# Patient Record
Sex: FEMALE | Race: BLACK OR AFRICAN AMERICAN | NOT HISPANIC OR LATINO | Employment: FULL TIME | URBAN - METROPOLITAN AREA
[De-identification: names, ages, dates, MRNs, and addresses within clinical notes are randomized per-mention and may not be internally consistent; named-entity substitution may affect disease eponyms.]

---

## 2017-04-10 LAB
EXTERNAL ABO GROUPING: NORMAL
EXTERNAL ANTIBODY SCREEN: NEGATIVE
EXTERNAL CHLAMYDIA SCREEN: NEGATIVE
EXTERNAL GONORRHEA SCREEN: NEGATIVE
EXTERNAL HEPATITIS B SURFACE ANTIGEN: NEGATIVE
EXTERNAL RH FACTOR: POSITIVE
EXTERNAL RUBELLA QUALITATIVE: NORMAL
EXTERNAL SYPHILIS RPR SCREEN: NORMAL
EXTERNAL URINE DRUG SCREEN: NEGATIVE
HIV1 P24 AG SERPL QL IA: NORMAL

## 2017-04-14 ENCOUNTER — TELEPHONE (OUTPATIENT)
Dept: ENDOCRINOLOGY | Facility: CLINIC | Age: 32
End: 2017-04-14

## 2017-04-14 RX ORDER — LEVOTHYROXINE SODIUM 300 UG/1
300 TABLET ORAL DAILY
Qty: 30 TABLET | Refills: 5 | Status: SHIPPED | OUTPATIENT
Start: 2017-04-14 | End: 2018-06-13 | Stop reason: SDUPTHER

## 2017-04-14 NOTE — TELEPHONE ENCOUNTER
----- Message from Aisha Blackburn sent at 4/14/2017  8:52 AM EDT -----  Contact: DARON WITH [facility] DARON KEVIN WITH DR COMBS'S OFFICE CALLED TODAY WITH A TSH RESULT .803 FOR MS MICHAEL. SHE IS CURRENTLY NINE WEEKS PREGNANT. DR COMBS WOULD LIKE HER TO BE SEEN BY DR CABALLERO AS SOON AS POSSIBLE. MS MICHAEL IS CURRENTLY TAKING 200 MCG LEVOTHYROXINE. WILL SEND MESSAGE TO [facility] AS WELL.    [phone]

## 2017-04-14 NOTE — TELEPHONE ENCOUNTER
She has no appointment scheduled and no showed for her recent visit. Most likely she is not taking her medication. Please call the patient and ask her to take 300 mcg levothyroxine and send rx. If she is not taking any thyroid medication in the last few months (which is likely the truth), then we can go with her old dose of 250 mcg. Low level of thyroid hormone in pregnancy is dangerous, please make sure to let her know that this is important for the baby and bets outcomes. I can see her as soon as she can come, please ask her day preference and if she is flexible I will find a spot next week. But very important that she starts medication now.   Please call [facility] as well and let them know that she should be encouraged to take her levothyroxine

## 2017-04-14 NOTE — TELEPHONE ENCOUNTER
Spoke with pt and she stated that she has been taking her levothyroxine everyday. Rx for Levothyroxine 300 mcg was sent to pharmacy. Pt stated that she can come for an appointment next Tues or Thurs @ 8.

## 2017-04-25 ENCOUNTER — OFFICE VISIT (OUTPATIENT)
Dept: ENDOCRINOLOGY | Facility: CLINIC | Age: 32
End: 2017-04-25

## 2017-04-25 VITALS
HEIGHT: 63 IN | DIASTOLIC BLOOD PRESSURE: 72 MMHG | BODY MASS INDEX: 34.45 KG/M2 | SYSTOLIC BLOOD PRESSURE: 108 MMHG | WEIGHT: 194.4 LBS

## 2017-04-25 DIAGNOSIS — Z3A.11 11 WEEKS GESTATION OF PREGNANCY: ICD-10-CM

## 2017-04-25 DIAGNOSIS — E03.9 ACQUIRED HYPOTHYROIDISM: Primary | ICD-10-CM

## 2017-04-25 PROCEDURE — 99213 OFFICE O/P EST LOW 20 MIN: CPT | Performed by: INTERNAL MEDICINE

## 2017-04-25 NOTE — PROGRESS NOTES
Chief complaint  Hypothyroidism (F/u for hypothyroidism, [facility]. Pt stated no new sx or concerns)    Subjective   Yuki Conner is a 31 y.o. female is here today for follow-up.  Hypothyroidism: The patient is being seen for follow-up of hypothyroidism. The patient has hypothyroidism of undetermined etiology and dx 2009. She has difficult to control disease, -250 when first started coming in 2015. Likely noncompliance.   Last visit in March 2016 her TSH was 18.8 and levothyroxine dose increase to 250 mcg a day.   She is pregnant and her labs 4/14/17 at [facility] showed elevated TSH of 122.803. We have increased the levothyroxine to 300 mcg daily. She is pregnant now, 11 weeks .   She occasionally skips the medication.     No symptoms.       Medications    Current Outpatient Prescriptions:   •  levothyroxine (SYNTHROID, LEVOTHROID) 300 MCG tablet, Take 1 tablet by mouth Daily., Disp: 30 tablet, Rfl: 5  •  vitamin D (ERGOCALCIFEROL) 51600 UNITS capsule capsule, Take 1 capsule by mouth 1 (One) Time Per Week., Disp: 5 capsule, Rfl: 5    PMH  The following portions of the patient's history were reviewed and updated as appropriate: allergies, current medications, past family history, past medical history, past social history, past surgical history and problem list.    Review of systems  Review of Systems   Constitutional: Positive for fatigue. Negative for activity change, appetite change, chills and diaphoresis. Unexpected weight change: weight gain.   HENT: Negative for congestion, ear pain, facial swelling, hearing loss, postnasal drip, sore throat, trouble swallowing and voice change.    Eyes: Negative.  Negative for redness, itching and visual disturbance.   Respiratory: Negative for cough and shortness of breath.    Cardiovascular: Negative for chest pain, palpitations and leg swelling.   Gastrointestinal: Negative for abdominal distention, abdominal pain, constipation, diarrhea,  "nausea and vomiting.   Endocrine:        As listed in HPI   Genitourinary: Negative.    Musculoskeletal: Positive for arthralgias. Negative for back pain, joint swelling, neck pain and neck stiffness.   Skin: Negative.    Allergic/Immunologic: Negative.    Neurological: Negative for dizziness, tremors, syncope, weakness, light-headedness and headaches.   Hematological: Negative.    Psychiatric/Behavioral: Negative.    All other systems reviewed and are negative.      Physical exam  Objective   Blood pressure 108/72, height 63\" (160 cm), weight 194 lb 6.4 oz (88.2 kg).   Physical Exam   Constitutional: She is oriented to person, place, and time. She appears well-developed and well-nourished.   HENT:   Head: Normocephalic and atraumatic.   Eyes: Conjunctivae are normal.   Neck: Normal range of motion. No muscular tenderness present. Carotid bruit is not present. No thyroid mass and no thyromegaly present.   The neck is supple and no assimetry visualized   Cardiovascular: Normal rate, regular rhythm and normal heart sounds.    Pulmonary/Chest: Effort normal and breath sounds normal.   Lymphadenopathy:     She has no cervical adenopathy.   Neurological: She is alert and oriented to person, place, and time.   Psychiatric: She has a normal mood and affect. Thought content normal.   Vitals reviewed.        LABS AND IMAGING    Reviewed labs from [facility]/ . 08       Assessment  Assessment/Plan   Problem List Items Addressed This Visit        Endocrine    Hypothyroidism - Primary    Relevant Orders    T4, Free    TSH      Other Visit Diagnoses     11 weeks gestation of pregnancy              Plan  Levothyroxine 300 mcg a day. Repeat labs for dose adjustments in 4-6 weeks. Compliance encouraged. I have given her the sample of Tirosint 300 mcg daily for improved absorption   Explained the importance of taking thyroid medication during pregnancy. Hypothyroidism is associated with miscarriage and fetal development " complications. Compliance encouraged.   Take medication on an empty stomach.   Follow-up in 6-8 weeks, labs before the visit.

## 2017-08-24 LAB — EXTERNAL GLUCOSE TOLERANCE TEST FASTING: 92 MG/DL

## 2017-10-13 ENCOUNTER — HOSPITAL ENCOUNTER (OUTPATIENT)
Facility: HOSPITAL | Age: 32
End: 2017-10-13
Attending: OBSTETRICS & GYNECOLOGY | Admitting: OBSTETRICS & GYNECOLOGY

## 2017-10-19 ENCOUNTER — LAB REQUISITION (OUTPATIENT)
Dept: LAB | Facility: HOSPITAL | Age: 32
End: 2017-10-19

## 2017-10-19 DIAGNOSIS — Z34.83 ENCOUNTER FOR SUPERVISION OF OTHER NORMAL PREGNANCY, THIRD TRIMESTER: ICD-10-CM

## 2017-10-19 LAB — EXTERNAL GROUP B STREP ANTIGEN: NEGATIVE

## 2017-10-19 PROCEDURE — 87081 CULTURE SCREEN ONLY: CPT | Performed by: OBSTETRICS & GYNECOLOGY

## 2017-10-22 LAB — BACTERIA SPEC AEROBE CULT: NORMAL

## 2017-11-01 ENCOUNTER — PREP FOR SURGERY (OUTPATIENT)
Dept: OTHER | Facility: HOSPITAL | Age: 32
End: 2017-11-01

## 2017-11-01 DIAGNOSIS — Z3A.39 39 WEEKS GESTATION OF PREGNANCY: Primary | ICD-10-CM

## 2017-11-01 RX ORDER — OXYTOCIN/RINGER'S LACTATE 20/1000 ML
999 PLASTIC BAG, INJECTION (ML) INTRAVENOUS ONCE
Status: CANCELLED | OUTPATIENT
Start: 2017-11-01 | End: 2017-11-01

## 2017-11-01 RX ORDER — OXYTOCIN/RINGER'S LACTATE 20/1000 ML
125 PLASTIC BAG, INJECTION (ML) INTRAVENOUS CONTINUOUS PRN
Status: CANCELLED | OUTPATIENT
Start: 2017-11-01 | End: 2017-11-02

## 2017-11-01 RX ORDER — SODIUM CHLORIDE, SODIUM LACTATE, POTASSIUM CHLORIDE, CALCIUM CHLORIDE 600; 310; 30; 20 MG/100ML; MG/100ML; MG/100ML; MG/100ML
125 INJECTION, SOLUTION INTRAVENOUS CONTINUOUS
Status: CANCELLED | OUTPATIENT
Start: 2017-11-01

## 2017-11-01 RX ORDER — MAGNESIUM CARB/ALUMINUM HYDROX 105-160MG
30 TABLET,CHEWABLE ORAL ONCE
Status: CANCELLED | OUTPATIENT
Start: 2017-11-01 | End: 2017-11-01

## 2017-11-01 RX ORDER — OXYTOCIN/RINGER'S LACTATE 30/500 ML
2-24 PLASTIC BAG, INJECTION (ML) INTRAVENOUS
Status: CANCELLED | OUTPATIENT
Start: 2017-11-01

## 2017-11-01 RX ORDER — SODIUM CHLORIDE 0.9 % (FLUSH) 0.9 %
1-10 SYRINGE (ML) INJECTION AS NEEDED
Status: CANCELLED | OUTPATIENT
Start: 2017-11-01

## 2017-11-08 ENCOUNTER — HOSPITAL ENCOUNTER (INPATIENT)
Dept: LABOR AND DELIVERY | Facility: HOSPITAL | Age: 32
LOS: 3 days | Discharge: HOME | End: 2017-11-11
Attending: OBSTETRICS & GYNECOLOGY | Admitting: OBSTETRICS & GYNECOLOGY

## 2017-11-08 DIAGNOSIS — Z3A.39 39 WEEKS GESTATION OF PREGNANCY: ICD-10-CM

## 2017-11-08 PROBLEM — Z34.90 PREGNANCY: Status: ACTIVE | Noted: 2017-11-08

## 2017-11-08 LAB
DEPRECATED RDW RBC AUTO: 44.6 FL (ref 37–54)
ERYTHROCYTE [DISTWIDTH] IN BLOOD BY AUTOMATED COUNT: 14.1 % (ref 11.3–14.5)
HCT VFR BLD AUTO: 33.6 % (ref 34.5–44)
HGB BLD-MCNC: 11.6 G/DL (ref 11.5–15.5)
MCH RBC QN AUTO: 30.1 PG (ref 27–31)
MCHC RBC AUTO-ENTMCNC: 34.5 G/DL (ref 32–36)
MCV RBC AUTO: 87.3 FL (ref 80–99)
PLATELET # BLD AUTO: 109 10*3/MM3 (ref 150–450)
PMV BLD AUTO: 13 FL (ref 6–12)
RBC # BLD AUTO: 3.85 10*6/MM3 (ref 3.89–5.14)
WBC NRBC COR # BLD: 2.83 10*3/MM3 (ref 3.5–10.8)

## 2017-11-08 PROCEDURE — 86850 RBC ANTIBODY SCREEN: CPT | Performed by: NURSE PRACTITIONER

## 2017-11-08 PROCEDURE — 85027 COMPLETE CBC AUTOMATED: CPT | Performed by: NURSE PRACTITIONER

## 2017-11-08 PROCEDURE — 59200 INSERT CERVICAL DILATOR: CPT | Performed by: OBSTETRICS & GYNECOLOGY

## 2017-11-08 PROCEDURE — 59025 FETAL NON-STRESS TEST: CPT

## 2017-11-08 PROCEDURE — 86901 BLOOD TYPING SEROLOGIC RH(D): CPT | Performed by: NURSE PRACTITIONER

## 2017-11-08 PROCEDURE — 86900 BLOOD TYPING SEROLOGIC ABO: CPT | Performed by: NURSE PRACTITIONER

## 2017-11-08 RX ORDER — SODIUM CHLORIDE 0.9 % (FLUSH) 0.9 %
1-10 SYRINGE (ML) INJECTION AS NEEDED
Status: DISCONTINUED | OUTPATIENT
Start: 2017-11-08 | End: 2017-11-09 | Stop reason: HOSPADM

## 2017-11-08 RX ORDER — MAGNESIUM CARB/ALUMINUM HYDROX 105-160MG
30 TABLET,CHEWABLE ORAL ONCE
Status: DISCONTINUED | OUTPATIENT
Start: 2017-11-08 | End: 2017-11-09 | Stop reason: HOSPADM

## 2017-11-08 RX ORDER — PRENATAL WITH FERROUS FUM AND FOLIC ACID 3080; 920; 120; 400; 22; 1.84; 3; 20; 10; 1; 12; 200; 27; 25; 2 [IU]/1; [IU]/1; MG/1; [IU]/1; MG/1; MG/1; MG/1; MG/1; MG/1; MG/1; UG/1; MG/1; MG/1; MG/1; MG/1
1 TABLET ORAL DAILY
COMMUNITY
End: 2018-05-16

## 2017-11-08 RX ORDER — PROMETHAZINE HYDROCHLORIDE 25 MG/1
25 TABLET ORAL EVERY 6 HOURS PRN
COMMUNITY
End: 2017-11-11 | Stop reason: HOSPADM

## 2017-11-08 RX ORDER — SODIUM CHLORIDE, SODIUM LACTATE, POTASSIUM CHLORIDE, CALCIUM CHLORIDE 600; 310; 30; 20 MG/100ML; MG/100ML; MG/100ML; MG/100ML
125 INJECTION, SOLUTION INTRAVENOUS CONTINUOUS
Status: DISCONTINUED | OUTPATIENT
Start: 2017-11-08 | End: 2017-11-09 | Stop reason: SDUPTHER

## 2017-11-08 RX ORDER — OXYTOCIN/RINGER'S LACTATE 30/500 ML
2-24 PLASTIC BAG, INJECTION (ML) INTRAVENOUS
Status: DISCONTINUED | OUTPATIENT
Start: 2017-11-08 | End: 2017-11-09 | Stop reason: HOSPADM

## 2017-11-08 RX ADMIN — SODIUM CHLORIDE, POTASSIUM CHLORIDE, SODIUM LACTATE AND CALCIUM CHLORIDE 125 ML/HR: 600; 310; 30; 20 INJECTION, SOLUTION INTRAVENOUS at 23:09

## 2017-11-09 ENCOUNTER — ANESTHESIA EVENT (OUTPATIENT)
Dept: LABOR AND DELIVERY | Facility: HOSPITAL | Age: 32
End: 2017-11-09

## 2017-11-09 ENCOUNTER — ANESTHESIA (OUTPATIENT)
Dept: LABOR AND DELIVERY | Facility: HOSPITAL | Age: 32
End: 2017-11-09

## 2017-11-09 LAB
ABO GROUP BLD: NORMAL
ATMOSPHERIC PRESS: ABNORMAL MMHG
ATMOSPHERIC PRESS: ABNORMAL MMHG
BASE EXCESS BLDCOA CALC-SCNC: -6.5 MMOL/L (ref 0–2)
BASE EXCESS BLDCOV CALC-SCNC: -6.7 MMOL/L (ref 0–2)
BDY SITE: ABNORMAL
BLD GP AB SCN SERPL QL: NEGATIVE
CO2 BLDA-SCNC: 19.6 MMOL/L (ref 22–33)
CO2 BLDA-SCNC: 19.7 MMOL/L (ref 22–33)
HCO3 BLDCOA-SCNC: 18.5 MMOL/L (ref 16.9–20.5)
HCO3 BLDCOV-SCNC: 18.6 MMOL/L (ref 18.6–21.4)
HGB BLDA-MCNC: 16.5 G/DL (ref 14–18)
HGB BLDA-MCNC: 16.6 G/DL (ref 14–18)
HOROWITZ INDEX BLD+IHG-RTO: 21 %
HOROWITZ INDEX BLD+IHG-RTO: 21 %
MODALITY: ABNORMAL
MODALITY: ABNORMAL
PCO2 BLDCOA: 35.1 MMHG (ref 43.3–54.9)
PCO2 BLDCOV: 36.2 MM HG (ref 30–60)
PH BLDCOA: 7.33 PH UNITS (ref 7.2–7.3)
PH BLDCOV: 7.32 PH UNITS (ref 7.19–7.46)
PO2 BLDCOA: 22.6 MMHG (ref 11.5–43.3)
PO2 BLDCOV: 22.5 MM HG
RH BLD: POSITIVE
SAO2 % BLDCOA: 44.8 %
SAO2 % BLDCOA: ABNORMAL % (ref 45–75)
SAO2 % BLDCOV: 44 %

## 2017-11-09 PROCEDURE — 25010000002 BUTORPHANOL PER 1 MG: Performed by: OBSTETRICS & GYNECOLOGY

## 2017-11-09 PROCEDURE — C1755 CATHETER, INTRASPINAL: HCPCS | Performed by: ANESTHESIOLOGY

## 2017-11-09 PROCEDURE — 88307 TISSUE EXAM BY PATHOLOGIST: CPT | Performed by: OBSTETRICS & GYNECOLOGY

## 2017-11-09 PROCEDURE — 25010000002 FENTANYL CITRATE (PF) 100 MCG/2ML SOLUTION: Performed by: NURSE ANESTHETIST, CERTIFIED REGISTERED

## 2017-11-09 PROCEDURE — 82805 BLOOD GASES W/O2 SATURATION: CPT | Performed by: OBSTETRICS & GYNECOLOGY

## 2017-11-09 PROCEDURE — 59409 OBSTETRICAL CARE: CPT | Performed by: OBSTETRICS & GYNECOLOGY

## 2017-11-09 PROCEDURE — C1755 CATHETER, INTRASPINAL: HCPCS

## 2017-11-09 PROCEDURE — 51702 INSERT TEMP BLADDER CATH: CPT

## 2017-11-09 PROCEDURE — 59025 FETAL NON-STRESS TEST: CPT

## 2017-11-09 PROCEDURE — 25010000002 ROPIVACAINE PER 1 MG: Performed by: NURSE ANESTHETIST, CERTIFIED REGISTERED

## 2017-11-09 RX ORDER — PROMETHAZINE HYDROCHLORIDE 25 MG/1
25 TABLET ORAL EVERY 6 HOURS PRN
Status: DISCONTINUED | OUTPATIENT
Start: 2017-11-09 | End: 2017-11-11 | Stop reason: HOSPADM

## 2017-11-09 RX ORDER — ONDANSETRON 4 MG/1
4 TABLET, FILM COATED ORAL EVERY 8 HOURS PRN
Status: DISCONTINUED | OUTPATIENT
Start: 2017-11-09 | End: 2017-11-09 | Stop reason: SDUPTHER

## 2017-11-09 RX ORDER — METOCLOPRAMIDE 10 MG/1
10 TABLET ORAL ONCE
Status: DISCONTINUED | OUTPATIENT
Start: 2017-11-09 | End: 2017-11-11 | Stop reason: HOSPADM

## 2017-11-09 RX ORDER — PROMETHAZINE HYDROCHLORIDE 25 MG/ML
12.5 INJECTION, SOLUTION INTRAMUSCULAR; INTRAVENOUS EVERY 6 HOURS PRN
Status: DISCONTINUED | OUTPATIENT
Start: 2017-11-09 | End: 2017-11-11 | Stop reason: HOSPADM

## 2017-11-09 RX ORDER — ONDANSETRON 4 MG/1
4 TABLET, FILM COATED ORAL EVERY 6 HOURS PRN
Status: DISCONTINUED | OUTPATIENT
Start: 2017-11-09 | End: 2017-11-11 | Stop reason: HOSPADM

## 2017-11-09 RX ORDER — ZOLPIDEM TARTRATE 5 MG/1
5 TABLET ORAL NIGHTLY PRN
Status: DISCONTINUED | OUTPATIENT
Start: 2017-11-09 | End: 2017-11-11 | Stop reason: HOSPADM

## 2017-11-09 RX ORDER — PROMETHAZINE HYDROCHLORIDE 12.5 MG/1
12.5 SUPPOSITORY RECTAL EVERY 6 HOURS PRN
Status: DISCONTINUED | OUTPATIENT
Start: 2017-11-09 | End: 2017-11-11 | Stop reason: HOSPADM

## 2017-11-09 RX ORDER — ONDANSETRON 2 MG/ML
4 INJECTION INTRAMUSCULAR; INTRAVENOUS ONCE AS NEEDED
Status: DISCONTINUED | OUTPATIENT
Start: 2017-11-09 | End: 2017-11-09 | Stop reason: HOSPADM

## 2017-11-09 RX ORDER — OXYCODONE HYDROCHLORIDE AND ACETAMINOPHEN 5; 325 MG/1; MG/1
1 TABLET ORAL EVERY 4 HOURS PRN
Status: DISCONTINUED | OUTPATIENT
Start: 2017-11-09 | End: 2017-11-11 | Stop reason: HOSPADM

## 2017-11-09 RX ORDER — DIPHENHYDRAMINE HYDROCHLORIDE 50 MG/ML
12.5 INJECTION INTRAMUSCULAR; INTRAVENOUS EVERY 8 HOURS PRN
Status: DISCONTINUED | OUTPATIENT
Start: 2017-11-09 | End: 2017-11-09 | Stop reason: HOSPADM

## 2017-11-09 RX ORDER — PROMETHAZINE HYDROCHLORIDE 12.5 MG/1
12.5 TABLET ORAL EVERY 4 HOURS PRN
Status: DISCONTINUED | OUTPATIENT
Start: 2017-11-09 | End: 2017-11-11 | Stop reason: HOSPADM

## 2017-11-09 RX ORDER — FENTANYL CITRATE 50 UG/ML
INJECTION, SOLUTION INTRAMUSCULAR; INTRAVENOUS AS NEEDED
Status: DISCONTINUED | OUTPATIENT
Start: 2017-11-09 | End: 2017-11-09 | Stop reason: SURG

## 2017-11-09 RX ORDER — TRISODIUM CITRATE DIHYDRATE AND CITRIC ACID MONOHYDRATE 500; 334 MG/5ML; MG/5ML
30 SOLUTION ORAL ONCE
Status: DISCONTINUED | OUTPATIENT
Start: 2017-11-09 | End: 2017-11-09 | Stop reason: HOSPADM

## 2017-11-09 RX ORDER — DOCUSATE SODIUM 100 MG/1
100 CAPSULE, LIQUID FILLED ORAL 2 TIMES DAILY
Status: DISCONTINUED | OUTPATIENT
Start: 2017-11-10 | End: 2017-11-11 | Stop reason: HOSPADM

## 2017-11-09 RX ORDER — OXYCODONE HYDROCHLORIDE AND ACETAMINOPHEN 5; 325 MG/1; MG/1
2 TABLET ORAL EVERY 4 HOURS PRN
Status: DISCONTINUED | OUTPATIENT
Start: 2017-11-09 | End: 2017-11-11 | Stop reason: HOSPADM

## 2017-11-09 RX ORDER — BISACODYL 10 MG
10 SUPPOSITORY, RECTAL RECTAL DAILY PRN
Status: DISCONTINUED | OUTPATIENT
Start: 2017-11-10 | End: 2017-11-11 | Stop reason: HOSPADM

## 2017-11-09 RX ORDER — SODIUM CHLORIDE 0.9 % (FLUSH) 0.9 %
1-10 SYRINGE (ML) INJECTION AS NEEDED
Status: DISCONTINUED | OUTPATIENT
Start: 2017-11-09 | End: 2017-11-11 | Stop reason: HOSPADM

## 2017-11-09 RX ORDER — LANOLIN 100 %
OINTMENT (GRAM) TOPICAL
Status: DISCONTINUED | OUTPATIENT
Start: 2017-11-09 | End: 2017-11-11 | Stop reason: HOSPADM

## 2017-11-09 RX ORDER — METOCLOPRAMIDE HYDROCHLORIDE 5 MG/ML
10 INJECTION INTRAMUSCULAR; INTRAVENOUS ONCE AS NEEDED
Status: DISCONTINUED | OUTPATIENT
Start: 2017-11-09 | End: 2017-11-09 | Stop reason: HOSPADM

## 2017-11-09 RX ORDER — ACETAMINOPHEN 500 MG
1000 TABLET ORAL EVERY 6 HOURS PRN
Status: DISCONTINUED | OUTPATIENT
Start: 2017-11-09 | End: 2017-11-11 | Stop reason: HOSPADM

## 2017-11-09 RX ORDER — OXYTOCIN/RINGER'S LACTATE 20/1000 ML
999 PLASTIC BAG, INJECTION (ML) INTRAVENOUS ONCE
Status: COMPLETED | OUTPATIENT
Start: 2017-11-09 | End: 2017-11-09

## 2017-11-09 RX ORDER — FAMOTIDINE 10 MG/ML
20 INJECTION, SOLUTION INTRAVENOUS ONCE AS NEEDED
Status: DISCONTINUED | OUTPATIENT
Start: 2017-11-09 | End: 2017-11-09 | Stop reason: HOSPADM

## 2017-11-09 RX ORDER — SODIUM CHLORIDE, SODIUM LACTATE, POTASSIUM CHLORIDE, CALCIUM CHLORIDE 600; 310; 30; 20 MG/100ML; MG/100ML; MG/100ML; MG/100ML
125 INJECTION, SOLUTION INTRAVENOUS CONTINUOUS
Status: DISCONTINUED | OUTPATIENT
Start: 2017-11-09 | End: 2017-11-11 | Stop reason: HOSPADM

## 2017-11-09 RX ORDER — LIDOCAINE HYDROCHLORIDE AND EPINEPHRINE 15; 5 MG/ML; UG/ML
INJECTION, SOLUTION EPIDURAL AS NEEDED
Status: DISCONTINUED | OUTPATIENT
Start: 2017-11-09 | End: 2017-11-09 | Stop reason: SURG

## 2017-11-09 RX ORDER — KETOROLAC TROMETHAMINE 15 MG/ML
15 INJECTION, SOLUTION INTRAMUSCULAR; INTRAVENOUS EVERY 6 HOURS PRN
Status: ACTIVE | OUTPATIENT
Start: 2017-11-09 | End: 2017-11-10

## 2017-11-09 RX ORDER — ONDANSETRON 2 MG/ML
4 INJECTION INTRAMUSCULAR; INTRAVENOUS EVERY 6 HOURS PRN
Status: DISCONTINUED | OUTPATIENT
Start: 2017-11-09 | End: 2017-11-11 | Stop reason: HOSPADM

## 2017-11-09 RX ORDER — EPHEDRINE SULFATE/0.9% NACL/PF 50 MG/10ML
10 SYRINGE (ML) INTRAVENOUS
Status: DISCONTINUED | OUTPATIENT
Start: 2017-11-09 | End: 2017-11-09 | Stop reason: HOSPADM

## 2017-11-09 RX ORDER — PROMETHAZINE HYDROCHLORIDE 25 MG/ML
12.5 INJECTION, SOLUTION INTRAMUSCULAR; INTRAVENOUS EVERY 4 HOURS PRN
Status: DISCONTINUED | OUTPATIENT
Start: 2017-11-09 | End: 2017-11-11 | Stop reason: HOSPADM

## 2017-11-09 RX ORDER — OXYTOCIN/RINGER'S LACTATE 20/1000 ML
125 PLASTIC BAG, INJECTION (ML) INTRAVENOUS CONTINUOUS PRN
Status: DISCONTINUED | OUTPATIENT
Start: 2017-11-09 | End: 2017-11-09 | Stop reason: HOSPADM

## 2017-11-09 RX ORDER — ONDANSETRON 2 MG/ML
4 INJECTION INTRAMUSCULAR; INTRAVENOUS EVERY 6 HOURS PRN
Status: DISCONTINUED | OUTPATIENT
Start: 2017-11-09 | End: 2017-11-09 | Stop reason: SDUPTHER

## 2017-11-09 RX ADMIN — Medication 125 ML/HR: at 20:55

## 2017-11-09 RX ADMIN — SODIUM CHLORIDE, POTASSIUM CHLORIDE, SODIUM LACTATE AND CALCIUM CHLORIDE 125 ML/HR: 600; 310; 30; 20 INJECTION, SOLUTION INTRAVENOUS at 12:10

## 2017-11-09 RX ADMIN — LIDOCAINE HYDROCHLORIDE AND EPINEPHRINE 3 ML: 15; 5 INJECTION, SOLUTION EPIDURAL at 09:39

## 2017-11-09 RX ADMIN — SODIUM CHLORIDE, POTASSIUM CHLORIDE, SODIUM LACTATE AND CALCIUM CHLORIDE 1000 ML: 600; 310; 30; 20 INJECTION, SOLUTION INTRAVENOUS at 08:53

## 2017-11-09 RX ADMIN — BENZOCAINE AND MENTHOL: 20; .5 SPRAY TOPICAL at 22:48

## 2017-11-09 RX ADMIN — ACETAMINOPHEN 1000 MG: 500 TABLET ORAL at 18:25

## 2017-11-09 RX ADMIN — ROPIVACAINE HYDROCHLORIDE 15 ML/HR: 5 INJECTION, SOLUTION EPIDURAL; INFILTRATION; PERINEURAL at 09:49

## 2017-11-09 RX ADMIN — SODIUM CHLORIDE, POTASSIUM CHLORIDE, SODIUM LACTATE AND CALCIUM CHLORIDE 125 ML/HR: 600; 310; 30; 20 INJECTION, SOLUTION INTRAVENOUS at 09:58

## 2017-11-09 RX ADMIN — LIDOCAINE HYDROCHLORIDE AND EPINEPHRINE 2 ML: 15; 5 INJECTION, SOLUTION EPIDURAL at 09:42

## 2017-11-09 RX ADMIN — Medication 10 MG: at 12:28

## 2017-11-09 RX ADMIN — SODIUM CHLORIDE, POTASSIUM CHLORIDE, SODIUM LACTATE AND CALCIUM CHLORIDE 125 ML/HR: 600; 310; 30; 20 INJECTION, SOLUTION INTRAVENOUS at 18:11

## 2017-11-09 RX ADMIN — WITCH HAZEL 1 PAD: 500 SOLUTION RECTAL; TOPICAL at 22:48

## 2017-11-09 RX ADMIN — SODIUM CHLORIDE, POTASSIUM CHLORIDE, SODIUM LACTATE AND CALCIUM CHLORIDE 125 ML/HR: 600; 310; 30; 20 INJECTION, SOLUTION INTRAVENOUS at 13:15

## 2017-11-09 RX ADMIN — Medication 3 APPLICATION: at 22:49

## 2017-11-09 RX ADMIN — BUTORPHANOL TARTRATE 2 MG: 2 INJECTION, SOLUTION INTRAMUSCULAR; INTRAVENOUS at 02:00

## 2017-11-09 RX ADMIN — OXYTOCIN 2 MILLI-UNITS/MIN: 10 INJECTION INTRAVENOUS at 06:47

## 2017-11-09 RX ADMIN — FENTANYL CITRATE 100 MCG: 50 INJECTION, SOLUTION INTRAMUSCULAR; INTRAVENOUS at 09:43

## 2017-11-09 RX ADMIN — Medication 999 ML/HR: at 20:23

## 2017-11-09 RX ADMIN — OXYCODONE AND ACETAMINOPHEN 1 TABLET: 5; 325 TABLET ORAL at 22:48

## 2017-11-09 RX ADMIN — ROPIVACAINE HYDROCHLORIDE 10 ML: 5 INJECTION, SOLUTION EPIDURAL; INFILTRATION; PERINEURAL at 09:46

## 2017-11-09 RX ADMIN — Medication 10 MG: at 13:12

## 2017-11-09 NOTE — ANESTHESIA PROCEDURE NOTES
Labor Epidural    Patient location during procedure: OB  Performed By  Anesthesiologist: ERIBERTO WYMAN  CRNA: JESUS BALTAZAR  Preanesthetic Checklist  Completed: patient identified, surgical consent, pre-op evaluation, timeout performed, IV checked, risks and benefits discussed and monitors and equipment checked  Prep:  Pt Position:sitting  Sterile Tech:cap, gloves, mask and sterile barrier  Prep:DuraPrep  Monitoring:blood pressure monitoring  Epidural Block Procedure:  Approach:midline  Guidance:palpation technique  Location:L3-L4  Needle Type:Tuohy  Needle Gauge:17 G  Loss of Resistance Medium: air  Loss of Resistance: 5cm  Cath Depth at skin:10 cm  Paresthesia: none  Aspiration:negative  Test Dose:negative  Number of Attempts: 1  Post Assessment:  Dressing:occlusive dressing applied and secured with tape  Pt Tolerance:patient tolerated the procedure well with no apparent complications  Complications:no

## 2017-11-09 NOTE — NURSING NOTE
0930--sitting up for epidural  0945--lying down on right side  1036--turned to back ch cath inserted without difficulty  1040--turned to left side, fht's dropped to  60's , turned back to right side, fht's up to 130's without further interventions  1232--ephridrine 10 mg given iv for fetal well being  1256- vaginal exam done, turned to left side, mod amount of bright red bloody show, late decels down to 90's after turn to left side,   1308--dr browne notified after pitocin turned off  1309--turned back to right side, iv bolus continues, ephridrine 10 mg given po

## 2017-11-09 NOTE — PROGRESS NOTES
CE still 8/90/0. IUPC placed without difficulty. Will restart pitocin as contractions do not seem adequate, and position with peanut ball. Dr way aware

## 2017-11-09 NOTE — ANESTHESIA PREPROCEDURE EVALUATION
Anesthesia Evaluation            Airway   Dental      Pulmonary - negative pulmonary ROS   Cardiovascular         Neuro/Psych- negative ROS  GI/Hepatic/Renal/Endo    (+)  hypothyroidism,     Musculoskeletal     Abdominal    Substance History - negative use     OB/GYN    (+) Pregnant,         Other - negative ROS       ROS/Med Hx Other: Sjogrens syndrome                                Anesthesia Plan    ASA 2     epidural     Anesthetic plan and risks discussed with patient.

## 2017-11-09 NOTE — PROGRESS NOTES
FB out. CE 5/70/-2, vx AROM, possible slight mec tinged but difficult to tell, will monitor. gbs neg. Cat 1 tracing.

## 2017-11-09 NOTE — PROCEDURES
32 y.o.  OB History      Para Term  AB Living    2 1  1  2    SAB TAB Ectopic Multiple Live Births       1 2       Presents as an induction of labour  Her primary OB requests a Zhou Bulb placement to initiate the induction of labour.    Fetal Heart Rate Assessment   Method: Fetal HR Assessment Method: external   Beats/min: Fetal HR (Beats/Min): 155   Baseline: Fetal HR Baseline: normal range (110-160 bpm)   Varibility: Fetal HR Variability: moderate (amplitude range 6 to 25 bpm)   Accels: Fetal HR Accelerations: greater than/equal to 15 bpm, lasting at least 15 seconds   Decels: Fetal HR Decelerations: absent   Tracing Category:       TOCO:  None  SVE:  FT/80/-3    A Zhou Bulb was placed without difficulties with 60 cc of sterile saline.  The patient tolerated the procedure well.

## 2017-11-10 LAB
BASOPHILS # BLD AUTO: 0.01 10*3/MM3 (ref 0–0.2)
BASOPHILS NFR BLD AUTO: 0.1 % (ref 0–1)
DEPRECATED RDW RBC AUTO: 46.4 FL (ref 37–54)
EOSINOPHIL # BLD AUTO: 0 10*3/MM3 (ref 0–0.3)
EOSINOPHIL NFR BLD AUTO: 0 % (ref 0–3)
ERYTHROCYTE [DISTWIDTH] IN BLOOD BY AUTOMATED COUNT: 14.4 % (ref 11.3–14.5)
HCT VFR BLD AUTO: 34.2 % (ref 34.5–44)
HGB BLD-MCNC: 11.6 G/DL (ref 11.5–15.5)
IMM GRANULOCYTES # BLD: 0.05 10*3/MM3 (ref 0–0.03)
IMM GRANULOCYTES NFR BLD: 0.3 % (ref 0–0.6)
LYMPHOCYTES # BLD AUTO: 1.55 10*3/MM3 (ref 0.6–4.8)
LYMPHOCYTES NFR BLD AUTO: 9.7 % (ref 24–44)
MCH RBC QN AUTO: 30 PG (ref 27–31)
MCHC RBC AUTO-ENTMCNC: 33.9 G/DL (ref 32–36)
MCV RBC AUTO: 88.4 FL (ref 80–99)
MONOCYTES # BLD AUTO: 0.59 10*3/MM3 (ref 0–1)
MONOCYTES NFR BLD AUTO: 3.7 % (ref 0–12)
NEUTROPHILS # BLD AUTO: 13.7 10*3/MM3 (ref 1.5–8.3)
NEUTROPHILS NFR BLD AUTO: 86.2 % (ref 41–71)
PLATELET # BLD AUTO: 99 10*3/MM3 (ref 150–450)
PMV BLD AUTO: 12.5 FL (ref 6–12)
RBC # BLD AUTO: 3.87 10*6/MM3 (ref 3.89–5.14)
WBC NRBC COR # BLD: 15.9 10*3/MM3 (ref 3.5–10.8)

## 2017-11-10 PROCEDURE — 85025 COMPLETE CBC W/AUTO DIFF WBC: CPT | Performed by: OBSTETRICS & GYNECOLOGY

## 2017-11-10 RX ADMIN — OXYCODONE AND ACETAMINOPHEN 1 TABLET: 5; 325 TABLET ORAL at 02:44

## 2017-11-10 RX ADMIN — DOCUSATE SODIUM 100 MG: 100 CAPSULE, LIQUID FILLED ORAL at 08:29

## 2017-11-10 RX ADMIN — OXYCODONE AND ACETAMINOPHEN 1 TABLET: 5; 325 TABLET ORAL at 20:53

## 2017-11-10 RX ADMIN — DOCUSATE SODIUM 100 MG: 100 CAPSULE, LIQUID FILLED ORAL at 20:53

## 2017-11-10 NOTE — L&D DELIVERY NOTE
[facility]  Vaginal Delivery Note    Delivery     Delivery: Vaginal, Spontaneous Delivery     YOB: 2015    Time of Birth: 8:19 PM      Anesthesia: Epidural     Delivering clinician: Lorenzo Montaño    Forceps?   No   Vacuum? No    Shoulder dystocia present: No        Delivery narrative:  31 yo  s/p vaginal twins 11 years ago.   She had presented as an induction.   She received a Zhou Bulb, AROM, and pitocin.   She was complicated by chorioamnionitis and mec.  She started having repetitive late decels, but was found to be complete.  She was able to push well and over about 20 minutes delivered a live male infant .  Taking to paeds for evaluation.   Placenta delivered intact.  No lacerations.  She did initially have brisk bleeding, but resolved after pitocin and Methergine.        Infant    Findings: male  infant     Infant observations: Weight: 6 lb 12.1 oz (3.065 kg)   Length: 20  in  Observations/Comments:         Apgars: 7   @ 1 minute /    9   @ 5 minutes         Placenta, Cord, and Fluid    Placenta delivered  Spontaneous  at     8:22 PM     Cord: 3 vessels  present.   Nuchal Cord?  no   Cord blood obtained: Yes    Cord gases obtained:  Yes    Cord gas results: Venous:    Lab Results   Component Value Date    PHCVEN 7.319 2017       Arterial:    Lab Results   Component Value Date    PHCART 7.33 (H) 2017        Repair    Episiotomy: None    Lacerations: No   Estimated Blood Loss: Est. Blood Loss (mL): 350 mL (Filed from Delivery Summary) (17 2019)           Complications  Chorioamnionitis    Disposition  Mother to Mother Baby/Postpartum  in stable condition currently.  Baby to NBN  in stable condition currently.      Lorenzo Montaño MD  17  9:27 PM

## 2017-11-10 NOTE — ANESTHESIA POSTPROCEDURE EVALUATION
Patient: Yuki Conner    Procedure Summary     Date Anesthesia Start Anesthesia Stop Room / Location    11/09/17 0928 2022        Procedure Diagnosis Scheduled Providers Provider    LABOR ANALGESIA No diagnosis on file.  Kimberli Graf DO          Anesthesia Type: epidural  Last vitals  BP   99/56 (11/10/17 0300)   Temp   99.4 °F (37.4 °C) (nurse aware) (11/10/17 0300)   Pulse   90 (11/10/17 0300)   Resp   16 (11/10/17 0300)     SpO2         Post Anesthesia Care and Evaluation    Patient location during evaluation: bedside  Patient participation: complete - patient participated  Level of consciousness: awake and alert  Pain management: adequate  Airway patency: patent  Anesthetic complications: No anesthetic complications    Cardiovascular status: acceptable  Respiratory status: acceptable  Hydration status: acceptable  Post Neuraxial Block status: Motor and sensory function returned to baseline and No signs or symptoms of PDPH

## 2017-11-10 NOTE — PLAN OF CARE
Problem: Postpartum, Vaginal Delivery (Adult)  Goal: Signs and Symptoms of Listed Potential Problems Will be Absent or Manageable (Postpartum, Vaginal Delivery)  Outcome: Ongoing (interventions implemented as appropriate)  Pain controlled well, no s\s of infection, light bleeding noted, voiding spontaneously, no distress noted this shift, V/S WDL

## 2017-11-10 NOTE — PROGRESS NOTES
11/10/2017  PPD #1    Subjective   Yuki feels well.  Patient describes her lochia less than menses.  Pain is well controlled       Objective   Temp: Temp:  [97.4 °F (36.3 °C)-100.4 °F (38 °C)] 99.4 °F (37.4 °C) Temp src: Oral   BP: BP: ()/(49-93) 99/56        Pulse: Heart Rate:  [] 90  RR: Resp:  [16-18] 16    General:  No acute distress   Abdomen: Fundus firm and beneath umbilicus   Pelvis: deferred     Lab Results   Component Value Date    WBC 15.90 (H) 11/10/2017    HGB 11.6 11/10/2017    HCT 34.2 (L) 11/10/2017    MCV 88.4 11/10/2017    PLT 99 (L) 11/10/2017    HEPBSAG Negative 04/10/2017       Assessment  1. PPD# 1 after vaginal delivery    Plan  1. Routine postpartum care.  2. Desires circ      This note has been electronically signed.    Gabi Boston MD  November 10, 2017

## 2017-11-11 VITALS
RESPIRATION RATE: 20 BRPM | DIASTOLIC BLOOD PRESSURE: 67 MMHG | TEMPERATURE: 98.6 F | HEIGHT: 63 IN | SYSTOLIC BLOOD PRESSURE: 108 MMHG | BODY MASS INDEX: 36.5 KG/M2 | WEIGHT: 206 LBS | HEART RATE: 86 BPM

## 2017-11-11 PROBLEM — Z34.90 PREGNANCY: Status: RESOLVED | Noted: 2017-11-08 | Resolved: 2017-11-11

## 2017-11-11 LAB
DEPRECATED RDW RBC AUTO: 47.1 FL (ref 37–54)
ERYTHROCYTE [DISTWIDTH] IN BLOOD BY AUTOMATED COUNT: 14.6 % (ref 11.3–14.5)
HCT VFR BLD AUTO: 28 % (ref 34.5–44)
HGB BLD-MCNC: 9.4 G/DL (ref 11.5–15.5)
MCH RBC QN AUTO: 29.8 PG (ref 27–31)
MCHC RBC AUTO-ENTMCNC: 33.6 G/DL (ref 32–36)
MCV RBC AUTO: 88.9 FL (ref 80–99)
PLATELET # BLD AUTO: 107 10*3/MM3 (ref 150–450)
PMV BLD AUTO: 12.5 FL (ref 6–12)
RBC # BLD AUTO: 3.15 10*6/MM3 (ref 3.89–5.14)
WBC NRBC COR # BLD: 11.27 10*3/MM3 (ref 3.5–10.8)

## 2017-11-11 PROCEDURE — 85027 COMPLETE CBC AUTOMATED: CPT | Performed by: NURSE PRACTITIONER

## 2017-11-11 RX ORDER — IBUPROFEN 600 MG/1
600 TABLET ORAL EVERY 6 HOURS PRN
Qty: 30 TABLET | Refills: 0 | Status: SHIPPED | OUTPATIENT
Start: 2017-11-11 | End: 2017-11-28

## 2017-11-11 RX ADMIN — ACETAMINOPHEN 1000 MG: 500 TABLET ORAL at 08:34

## 2017-11-11 RX ADMIN — DOCUSATE SODIUM 100 MG: 100 CAPSULE, LIQUID FILLED ORAL at 08:35

## 2017-11-11 NOTE — DISCHARGE SUMMARY
Discharge Summary    Date of Admission: 2017  Date of Discharge:  2017      Patient: Yuki Conner      MR#:[id number]    Delivery Provider: Lorenzo Montaño     Presenting Problem/History of Present Illness  Pregnancy [Z34.90]     Patient Active Problem List   Diagnosis   (none) - all problems resolved or deleted         Discharge Diagnosis: Vaginal delivery at 39w5d    Procedures:  Vaginal, Spontaneous Delivery     2017    8:19 PM        Discharge Date: 2017;     Hospital Course  Patient is a 32 y.o. female  at 39w5d status post vaginal delivery without complication. Postpartum the patient did well. She remained afebrile, with vital signs stable. She was ready for discharge on postpartum day 2.     Infant:   male  fetus 6 lb 12.1 oz (3.065 kg)  with Apgar scores of 7  , 9   at five minutes.    Condition on Discharge:  Stable    Vital Signs  Temp:  [97.7 °F (36.5 °C)-99.5 °F (37.5 °C)] 97.7 °F (36.5 °C)  Heart Rate:  [84-95] 84  Resp:  [16-20] 16  BP: (101-117)/(59-74) 101/59    Lab Results   Component Value Date    WBC 11.27 (H) 2017    HGB 9.4 (L) 2017    HCT 28.0 (L) 2017    MCV 88.9 2017     (L) 2017       Discharge Disposition  Home or Self Care    Discharge Medications   Yuki Conner   Home Medication Instructions CIELO:[id number]    Printed on:17 1027   Medication Information                      ibuprofen (ADVIL,MOTRIN) 600 MG tablet  Take 1 tablet by mouth Every 6 (Six) Hours As Needed for Mild Pain .             levothyroxine (SYNTHROID, LEVOTHROID) 300 MCG tablet  Take 1 tablet by mouth Daily.             Prenatal Vit-Fe Fumarate-FA (PRENATAL 27-1) 27-1 MG tablet tablet  Take 1 tablet by mouth Daily.                 Discharge Diet:     Activity at Discharge:   Activity Instructions     Pelvic Rest                     Follow-up Appointments  No future appointments.  Additional Instructions for the Follow-ups that You Need to  Schedule     Call MD for problems / concerns.    As directed        Discharge Follow-up with Specified Provider:    As directed    Follow Up Details:  6 weeks with pavan       Discharge Follow-up with Specified Provider: dr browne; 6 Weeks    As directed    To:  dr browne   Follow Up:  6 Weeks       Discharge Follow-up with Specified Provider: dr browne; 6 Weeks    As directed    To:  dr browne   Follow Up:  6 Weeks                 DARRELL Rodriguez  11/11/17  10:27 AM  Csd

## 2017-11-12 NOTE — PLAN OF CARE
Problem: Patient Care Overview (Adult)  Goal: Plan of Care Review  Outcome: Outcome(s) achieved Date Met:  11/11/17 11/11/17 6755   Outcome Evaluation   Outcome Summary/Follow up Plan vitals within normal, tolerating PO, light lochia, no s/s of infection,        Goal: Adult Individualization and Mutuality  Outcome: Outcome(s) achieved Date Met:  11/11/17  Goal: Discharge Needs Assessment  Outcome: Outcome(s) achieved Date Met:  11/11/17    Problem: Labor (Cervical Ripen, Induct, Augment) (Adult,Obstetrics,Pediatric)  Goal: Signs and Symptoms of Listed Potential Problems Will be Absent or Manageable (Labor)  Outcome: Outcome(s) achieved Date Met:  11/11/17    Problem: Postpartum, Vaginal Delivery (Adult)  Goal: Signs and Symptoms of Listed Potential Problems Will be Absent or Manageable (Postpartum, Vaginal Delivery)  Outcome: Outcome(s) achieved Date Met:  11/11/17

## 2017-11-13 LAB
CYTO UR: NORMAL
LAB AP CASE REPORT: NORMAL
LAB AP CLINICAL INFORMATION: NORMAL
Lab: NORMAL
PATH REPORT.FINAL DX SPEC: NORMAL
PATH REPORT.GROSS SPEC: NORMAL

## 2017-11-28 ENCOUNTER — OFFICE VISIT (OUTPATIENT)
Dept: INTERNAL MEDICINE | Facility: CLINIC | Age: 32
End: 2017-11-28

## 2017-11-28 VITALS
HEIGHT: 63 IN | SYSTOLIC BLOOD PRESSURE: 112 MMHG | HEART RATE: 82 BPM | OXYGEN SATURATION: 99 % | DIASTOLIC BLOOD PRESSURE: 68 MMHG

## 2017-11-28 DIAGNOSIS — H66.001 ACUTE SUPPURATIVE OTITIS MEDIA OF RIGHT EAR WITHOUT SPONTANEOUS RUPTURE OF TYMPANIC MEMBRANE, RECURRENCE NOT SPECIFIED: ICD-10-CM

## 2017-11-28 DIAGNOSIS — H10.11 ALLERGIC CONJUNCTIVITIS OF RIGHT EYE: ICD-10-CM

## 2017-11-28 DIAGNOSIS — J01.00 ACUTE NON-RECURRENT MAXILLARY SINUSITIS: Primary | ICD-10-CM

## 2017-11-28 PROCEDURE — 99213 OFFICE O/P EST LOW 20 MIN: CPT | Performed by: NURSE PRACTITIONER

## 2017-11-28 RX ORDER — FLUTICASONE PROPIONATE 50 MCG
2 SPRAY, SUSPENSION (ML) NASAL DAILY
Qty: 1 BOTTLE | Refills: 0 | Status: SHIPPED | OUTPATIENT
Start: 2017-11-28 | End: 2018-05-16

## 2017-11-28 RX ORDER — AMOXICILLIN 500 MG/1
1000 CAPSULE ORAL 2 TIMES DAILY
Qty: 40 CAPSULE | Refills: 0 | Status: SHIPPED | OUTPATIENT
Start: 2017-11-28 | End: 2017-12-08

## 2017-11-28 NOTE — PROGRESS NOTES
Chief Complaint   Patient presents with   • Sinusitis     pressure in head, right ear is now clogged.        HPI  Yuki Conner is a 32 y.o. female who presents right frontal and maxillary sinus pain, right ear pressure with diminished hearing, some congestion. Started 4 days ago. Has taken congestion relief robitussin.    Is 3 weeks post partum, not breastfeeding.    [facility]  The following portions of the patient's history were reviewed and updated as appropriate: allergies, current medications, past family history, past medical history, past social history, past surgical history and problem list.    Past Medical History:   Diagnosis Date   • Anemia    • Disease of thyroid gland    • Elevated LFTs 9/27/2016   • Fatigue 9/27/2016   • Sinusitis 9/27/2016   • Sjogren's disease 9/27/2016     Past Surgical History:   Procedure Laterality Date   • ADENOIDECTOMY     • TONSILLECTOMY     • WISDOM TOOTH EXTRACTION       There are no active problems to display for this patient.    Social History   Substance Use Topics   • Smoking status: Never Smoker   • Smokeless tobacco: Never Used   • Alcohol use Yes      Comment: social drinker     Current Outpatient Prescriptions on File Prior to Visit   Medication Sig Dispense Refill   • levothyroxine (SYNTHROID, LEVOTHROID) 300 MCG tablet Take 1 tablet by mouth Daily. 30 tablet 5   • Prenatal Vit-Fe Fumarate-FA (PRENATAL 27-1) 27-1 MG tablet tablet Take 1 tablet by mouth Daily.     • [DISCONTINUED] ibuprofen (ADVIL,MOTRIN) 600 MG tablet Take 1 tablet by mouth Every 6 (Six) Hours As Needed for Mild Pain . 30 tablet 0     No current facility-administered medications on file prior to visit.          Review of Systems   Constitutional: Negative for fatigue and fever.   HENT: Positive for congestion, ear pain (pressure), rhinorrhea and sinus pressure. Negative for sore throat.    Eyes: Positive for redness (right) and itching.   Respiratory: Positive for cough (intermittent). Negative for  "shortness of breath and wheezing.    Cardiovascular: Negative.    Gastrointestinal: Negative.    Skin: Negative.    Neurological: Positive for headaches.   All other systems reviewed and are negative.          Objective   Blood pressure 112/68, pulse 82, height 63\" (160 cm), SpO2 99 %, unknown if currently breastfeeding.  There is no height or weight on file to calculate BMI.      Physical Exam   Constitutional: She is oriented to person, place, and time. She appears well-developed and well-nourished. No distress.   HENT:   Head: Normocephalic and atraumatic.   Right Ear: Tympanic membrane is erythematous and bulging. A middle ear effusion is present.   Left Ear: Tympanic membrane and ear canal normal.   Mouth/Throat: Uvula is midline, oropharynx is clear and moist and mucous membranes are normal.   Eyes: EOM are normal. Pupils are equal, round, and reactive to light. Right conjunctiva is injected.   Neck: Neck supple. No thyromegaly present.   Cardiovascular: Normal rate, regular rhythm and normal heart sounds.    Pulmonary/Chest: Effort normal and breath sounds normal.   Lymphadenopathy:     She has cervical adenopathy.   Neurological: She is alert and oriented to person, place, and time.   Reflex Scores:       Patellar reflexes are 2+ on the right side and 2+ on the left side.  Skin: Skin is warm and dry.   Psychiatric: She has a normal mood and affect. Her behavior is normal. Thought content normal.             ASSESSMENT/PLAN  1. Acute non-recurrent maxillary sinusitis    - fluticasone (FLONASE) 50 MCG/ACT nasal spray; 2 sprays into each nostril Daily.  Dispense: 1 bottle; Refill: 0    2. Acute suppurative otitis media of right ear without spontaneous rupture of tympanic membrane, recurrence not specified    - amoxicillin (AMOXIL) 500 MG capsule; Take 2 capsules by mouth 2 (Two) Times a Day for 10 days.  Dispense: 40 capsule; Refill: 0    3. Allergic conjunctivitis  Use allergy eye drops for right eye " prn    Plan of care reviewed with patient at the conclusion of today's visit. Education was provided in regards to diagnosis, management and any prescribed or recommended OTC medications.  Patient verbalizes Understanding of and agreement with management plan.      FOLLOW-UP  Return if symptoms worsen or fail to improve.      No future appointments.      Electronically signed by:  DARRELL Cagle  11/28/2017

## 2018-05-16 ENCOUNTER — OFFICE VISIT (OUTPATIENT)
Dept: INTERNAL MEDICINE | Facility: CLINIC | Age: 33
End: 2018-05-16

## 2018-05-16 VITALS
WEIGHT: 199.1 LBS | TEMPERATURE: 98.1 F | DIASTOLIC BLOOD PRESSURE: 80 MMHG | BODY MASS INDEX: 35.27 KG/M2 | HEART RATE: 87 BPM | OXYGEN SATURATION: 98 % | SYSTOLIC BLOOD PRESSURE: 100 MMHG

## 2018-05-16 DIAGNOSIS — J02.9 ACUTE VIRAL PHARYNGITIS: ICD-10-CM

## 2018-05-16 DIAGNOSIS — J04.0 LARYNGITIS: Primary | ICD-10-CM

## 2018-05-16 DIAGNOSIS — J02.9 SORE THROAT: ICD-10-CM

## 2018-05-16 LAB
EXPIRATION DATE: NORMAL
INTERNAL CONTROL: NORMAL
Lab: NORMAL
S PYO AG THROAT QL: NEGATIVE

## 2018-05-16 PROCEDURE — 99213 OFFICE O/P EST LOW 20 MIN: CPT | Performed by: INTERNAL MEDICINE

## 2018-05-16 PROCEDURE — 87880 STREP A ASSAY W/OPTIC: CPT | Performed by: INTERNAL MEDICINE

## 2018-05-16 RX ORDER — NORETHINDRONE ACETATE AND ETHINYL ESTRADIOL 1MG-20(21)
KIT ORAL
COMMUNITY
Start: 2018-05-11 | End: 2019-04-17

## 2018-05-16 RX ORDER — DIPHENHYDRAMINE HCL 25 MG
25 CAPSULE ORAL EVERY 6 HOURS PRN
COMMUNITY
End: 2019-04-17

## 2018-05-16 NOTE — PROGRESS NOTES
Subjective   Yuki Conner is a 32 y.o. female.   Chief Complaint   Patient presents with   • Hoarse       History of Present Illness   1 day history of sore throat and hoarseness. Started OTC med for allergies yesterday. No improvement. No body aches. No fever or chills. No vomiting. No diarrhea.   The following portions of the patient's history were reviewed and updated as appropriate: allergies, current medications, past family history, past medical history, past social history, past surgical history and problem list.    Review of Systems   Constitutional: Negative for activity change, appetite change, chills, diaphoresis, fatigue, fever and unexpected weight change.   HENT: Positive for rhinorrhea and sore throat. Negative for congestion, ear discharge, ear pain, mouth sores, nosebleeds, sinus pressure and sneezing.         Hoarseness   Eyes: Negative for pain, discharge and itching.   Respiratory: Negative for cough, chest tightness, shortness of breath and wheezing.    Cardiovascular: Negative for chest pain, palpitations and leg swelling.   Gastrointestinal: Negative for abdominal pain, constipation, diarrhea, nausea and vomiting.   Endocrine: Negative for cold intolerance, heat intolerance, polydipsia and polyphagia.   Genitourinary: Negative for dysuria, flank pain, frequency, hematuria and urgency.   Musculoskeletal: Negative for arthralgias, back pain, gait problem, myalgias, neck pain and neck stiffness.   Skin: Negative for color change, pallor and rash.   Neurological: Negative for seizures, speech difficulty, numbness and headaches.   Psychiatric/Behavioral: Negative for agitation, confusion, decreased concentration and sleep disturbance. The patient is not nervous/anxious.      /80   Pulse 87   Temp 98.1 °F (36.7 °C)   Wt 90.3 kg (199 lb 1.6 oz)   SpO2 98%   BMI 35.27 kg/m²     Objective   Physical Exam   Constitutional: She is oriented to person, place, and time. She appears  well-developed.   HENT:   Head: Normocephalic.   Right Ear: External ear normal.   Left Ear: External ear normal.   Nose: Nose normal.   Mouth/Throat: Oropharynx is clear and moist.   Eyes: Conjunctivae are normal. Pupils are equal, round, and reactive to light.   Neck: No JVD present. No thyromegaly present.   Cardiovascular: Normal rate, regular rhythm and normal heart sounds.  Exam reveals no friction rub.    No murmur heard.  Pulmonary/Chest: Effort normal and breath sounds normal. No respiratory distress. She has no wheezes. She has no rales.   Abdominal: Soft. Bowel sounds are normal. She exhibits no distension. There is no tenderness. There is no guarding.   Musculoskeletal: She exhibits no edema or tenderness.   Lymphadenopathy:     She has no cervical adenopathy.   Neurological: She is oriented to person, place, and time. She displays normal reflexes. No cranial nerve deficit.   Skin: No rash noted.   Psychiatric: Her behavior is normal.   Nursing note and vitals reviewed.      Assessment/Plan   Yuki was seen today for hoarse.    Diagnoses and all orders for this visit:    Laryngitis  Voice rest. If no improvement in 1 week, she will call the office back.  Acute viral pharyngitis  Strep negative.

## 2018-06-13 ENCOUNTER — OFFICE VISIT (OUTPATIENT)
Dept: ENDOCRINOLOGY | Facility: CLINIC | Age: 33
End: 2018-06-13

## 2018-06-13 ENCOUNTER — OFFICE VISIT (OUTPATIENT)
Dept: INTERNAL MEDICINE | Facility: CLINIC | Age: 33
End: 2018-06-13

## 2018-06-13 VITALS
BODY MASS INDEX: 36.14 KG/M2 | SYSTOLIC BLOOD PRESSURE: 100 MMHG | WEIGHT: 204 LBS | DIASTOLIC BLOOD PRESSURE: 68 MMHG | HEIGHT: 63 IN | OXYGEN SATURATION: 99 %

## 2018-06-13 VITALS
OXYGEN SATURATION: 99 % | SYSTOLIC BLOOD PRESSURE: 100 MMHG | DIASTOLIC BLOOD PRESSURE: 68 MMHG | WEIGHT: 204 LBS | HEART RATE: 87 BPM | BODY MASS INDEX: 36.14 KG/M2

## 2018-06-13 DIAGNOSIS — M35.1 MIXED CONNECTIVE TISSUE DISEASE (HCC): Primary | ICD-10-CM

## 2018-06-13 DIAGNOSIS — E03.9 HYPOTHYROIDISM, UNSPECIFIED TYPE: Primary | ICD-10-CM

## 2018-06-13 DIAGNOSIS — R53.83 OTHER FATIGUE: ICD-10-CM

## 2018-06-13 LAB
ALBUMIN SERPL-MCNC: 4.39 G/DL (ref 3.2–4.8)
ALBUMIN/GLOB SERPL: 1.1 G/DL (ref 1.5–2.5)
ALP SERPL-CCNC: 40 U/L (ref 25–100)
ALT SERPL W P-5'-P-CCNC: 16 U/L (ref 7–40)
ANION GAP SERPL CALCULATED.3IONS-SCNC: 8 MMOL/L (ref 3–11)
AST SERPL-CCNC: 20 U/L (ref 0–33)
BASOPHILS # BLD AUTO: 0.02 10*3/MM3 (ref 0–0.2)
BASOPHILS NFR BLD AUTO: 0.6 % (ref 0–1)
BILIRUB SERPL-MCNC: 0.7 MG/DL (ref 0.3–1.2)
BUN BLD-MCNC: 15 MG/DL (ref 9–23)
BUN/CREAT SERPL: 16.1 (ref 7–25)
CALCIUM SPEC-SCNC: 8.7 MG/DL (ref 8.7–10.4)
CHLORIDE SERPL-SCNC: 102 MMOL/L (ref 99–109)
CO2 SERPL-SCNC: 27 MMOL/L (ref 20–31)
CREAT BLD-MCNC: 0.93 MG/DL (ref 0.6–1.3)
DEPRECATED RDW RBC AUTO: 43.3 FL (ref 37–54)
EOSINOPHIL # BLD AUTO: 0.03 10*3/MM3 (ref 0–0.3)
EOSINOPHIL NFR BLD AUTO: 0.9 % (ref 0–3)
ERYTHROCYTE [DISTWIDTH] IN BLOOD BY AUTOMATED COUNT: 13.8 % (ref 11.3–14.5)
GFR SERPL CREATININE-BSD FRML MDRD: 85 ML/MIN/1.73
GLOBULIN UR ELPH-MCNC: 4 GM/DL
GLUCOSE BLD-MCNC: 92 MG/DL (ref 70–100)
HCT VFR BLD AUTO: 35 % (ref 34.5–44)
HGB BLD-MCNC: 11.4 G/DL (ref 11.5–15.5)
IMM GRANULOCYTES # BLD: 0 10*3/MM3 (ref 0–0.03)
IMM GRANULOCYTES NFR BLD: 0 % (ref 0–0.6)
LYMPHOCYTES # BLD AUTO: 1.33 10*3/MM3 (ref 0.6–4.8)
LYMPHOCYTES NFR BLD AUTO: 42.1 % (ref 24–44)
MCH RBC QN AUTO: 28.1 PG (ref 27–31)
MCHC RBC AUTO-ENTMCNC: 32.6 G/DL (ref 32–36)
MCV RBC AUTO: 86.2 FL (ref 80–99)
MONOCYTES # BLD AUTO: 0.35 10*3/MM3 (ref 0–1)
MONOCYTES NFR BLD AUTO: 11.1 % (ref 0–12)
NEUTROPHILS # BLD AUTO: 1.43 10*3/MM3 (ref 1.5–8.3)
NEUTROPHILS NFR BLD AUTO: 45.3 % (ref 41–71)
PLATELET # BLD AUTO: 148 10*3/MM3 (ref 150–450)
PMV BLD AUTO: 12.7 FL (ref 6–12)
POTASSIUM BLD-SCNC: 3.8 MMOL/L (ref 3.5–5.5)
PROT SERPL-MCNC: 8.4 G/DL (ref 5.7–8.2)
RBC # BLD AUTO: 4.06 10*6/MM3 (ref 3.89–5.14)
SODIUM BLD-SCNC: 137 MMOL/L (ref 132–146)
T4 FREE SERPL-MCNC: 0.26 NG/DL (ref 0.89–1.76)
TSH SERPL DL<=0.05 MIU/L-ACNC: >150 MIU/ML (ref 0.35–5.35)
WBC NRBC COR # BLD: 3.16 10*3/MM3 (ref 3.5–10.8)

## 2018-06-13 PROCEDURE — 99213 OFFICE O/P EST LOW 20 MIN: CPT | Performed by: PHYSICIAN ASSISTANT

## 2018-06-13 PROCEDURE — 84443 ASSAY THYROID STIM HORMONE: CPT | Performed by: INTERNAL MEDICINE

## 2018-06-13 PROCEDURE — 84439 ASSAY OF FREE THYROXINE: CPT | Performed by: INTERNAL MEDICINE

## 2018-06-13 PROCEDURE — 80053 COMPREHEN METABOLIC PANEL: CPT | Performed by: INTERNAL MEDICINE

## 2018-06-13 PROCEDURE — 85025 COMPLETE CBC W/AUTO DIFF WBC: CPT | Performed by: INTERNAL MEDICINE

## 2018-06-13 PROCEDURE — 99213 OFFICE O/P EST LOW 20 MIN: CPT | Performed by: INTERNAL MEDICINE

## 2018-06-13 RX ORDER — LEVOTHYROXINE SODIUM 300 UG/1
300 TABLET ORAL DAILY
Qty: 30 TABLET | Refills: 5 | Status: SHIPPED | OUTPATIENT
Start: 2018-06-13 | End: 2019-05-22 | Stop reason: DRUGHIGH

## 2018-06-13 NOTE — PROGRESS NOTES
Subjective   Yuki Conner is a 33 y.o. female.     History of Present Illness     The following portions of the patient's history were reviewed and updated as appropriate: allergies, current medications, past family history, past medical history, past social history, past surgical history and problem list.    Review of Systems   Constitutional: Positive for fatigue and unexpected weight change (weight gain). Negative for activity change, appetite change, chills, diaphoresis and fever.   HENT: Negative for congestion, ear discharge, ear pain, mouth sores, nosebleeds, sinus pressure, sneezing and sore throat.    Eyes: Negative for pain, discharge and itching.   Respiratory: Negative for cough, chest tightness, shortness of breath and wheezing.    Cardiovascular: Negative for chest pain, palpitations and leg swelling.   Gastrointestinal: Negative for abdominal pain, constipation, diarrhea, nausea and vomiting.   Endocrine: Negative for cold intolerance, heat intolerance, polydipsia and polyphagia.   Genitourinary: Negative for dysuria, flank pain, frequency, hematuria and urgency.   Musculoskeletal: Negative for arthralgias, back pain, gait problem, myalgias, neck pain and neck stiffness.   Skin: Negative for color change, pallor and rash.   Neurological: Negative for seizures, speech difficulty, numbness and headaches.   Psychiatric/Behavioral: Negative for agitation, confusion, decreased concentration and sleep disturbance. The patient is not nervous/anxious.      /68   Pulse 87   Wt 92.5 kg (204 lb)   SpO2 99%   BMI 36.14 kg/m²       Objective   Physical Exam   Constitutional: She is oriented to person, place, and time. She appears well-developed.   HENT:   Head: Normocephalic.   Right Ear: External ear normal.   Left Ear: External ear normal.   Nose: Nose normal.   Mouth/Throat: Oropharynx is clear and moist.   Eyes: Conjunctivae are normal. Pupils are equal, round, and reactive to light.   Neck: No  JVD present. No thyromegaly present.   Cardiovascular: Normal rate, regular rhythm and normal heart sounds.  Exam reveals no friction rub.    No murmur heard.  Pulmonary/Chest: Effort normal and breath sounds normal. No respiratory distress. She has no wheezes. She has no rales.   Abdominal: Soft. Bowel sounds are normal. She exhibits no distension. There is no tenderness. There is no guarding.   Musculoskeletal: She exhibits no edema or tenderness.   Lymphadenopathy:     She has no cervical adenopathy.   Neurological: She is oriented to person, place, and time. She displays normal reflexes. No cranial nerve deficit.   Skin: No rash noted.   Psychiatric: Her behavior is normal.   Nursing note and vitals reviewed.      Assessment/Plan   Yuki was seen today for discuss referral to rheumatology.    Diagnoses and all orders for this visit:    Mixed connective tissue disease  -     Ambulatory Referral to Rheumatology  -     CBC & Differential  -     Comprehensive Metabolic Panel    Other fatigue    [email or web address]

## 2018-06-13 NOTE — PROGRESS NOTES
"Chief complaint  Hypothyroidism (Follow UP )    Subjective   Yuki Conner is a 33 y.o. female is here today for follow-up.  Dr Thibodeaux's pt. Not seen since 4/2017.     Hypothyroidism: The patient is being seen for follow-up of hypothyroidism. The patient has hypothyroidism of undetermined etiology and dx 2009. She has difficult to control disease, -250 when first started coming in 2015. Likely noncompliance.   Last visit in March 2016 her TSH was 18.8 and levothyroxine dose increase to 250 mcg a day.   She was pregnant and her labs [facility]/14/[facility] showed elevated TSH of 122.803. We have increased the levothyroxine to 300 mcg daily.     Feels more fatigued and \"groggy\" for the past few months.  Misses levothyroxine dose occasionally, 1-2 times per month.      No symptoms.       Medications    Current Outpatient Prescriptions:   •  BLISOVI FE 1/20 1-20 MG-MCG per tablet, , Disp: , Rfl:   •  diphenhydrAMINE (BENADRYL) 25 mg capsule, Take 25 mg by mouth Every 6 (Six) Hours As Needed for Itching., Disp: , Rfl:   •  levothyroxine (SYNTHROID, LEVOTHROID) 300 MCG tablet, Take 1 tablet by mouth Daily., Disp: 30 tablet, Rfl: 5    PMH  The following portions of the patient's history were reviewed and updated as appropriate: allergies, current medications, past family history, past medical history, past social history, past surgical history and problem list.    Review of systems  Review of Systems   Constitutional: Positive for fatigue. Negative for activity change, appetite change, chills and diaphoresis. Unexpected weight change: weight gain.   HENT: Negative for congestion, ear pain, facial swelling, hearing loss, postnasal drip, sore throat, trouble swallowing and voice change.    Eyes: Negative.  Negative for redness, itching and visual disturbance.   Respiratory: Negative for cough and shortness of breath.    Cardiovascular: Negative for chest pain, palpitations and leg swelling.   Gastrointestinal: Negative for " "abdominal distention, abdominal pain, constipation, diarrhea, nausea and vomiting.   Endocrine:        As listed in HPI   Genitourinary: Negative.    Musculoskeletal: Positive for arthralgias. Negative for back pain, joint swelling, neck pain and neck stiffness.   Skin: Negative.    Allergic/Immunologic: Negative.    Neurological: Negative for dizziness, tremors, syncope, weakness, light-headedness and headaches.   Hematological: Negative.    Psychiatric/Behavioral: Negative.    All other systems reviewed and are negative.      Physical exam  Objective   Blood pressure 100/68, height 160 cm (63\"), weight 92.5 kg (204 lb), SpO2 99 %, unknown if currently breastfeeding.   Physical Exam   Constitutional: She is oriented to person, place, and time. She appears well-developed and well-nourished.   HENT:   Head: Normocephalic and atraumatic.   Eyes: Conjunctivae are normal.   Neck: Normal range of motion. No muscular tenderness present. Carotid bruit is not present. No thyroid mass and no thyromegaly present.   The neck is supple and no assimetry visualized   Cardiovascular: Normal rate, regular rhythm and normal heart sounds.    Pulmonary/Chest: Effort normal and breath sounds normal.   Lymphadenopathy:     She has no cervical adenopathy.   Neurological: She is alert and oriented to person, place, and time.   Psychiatric: She has a normal mood and affect. Thought content normal.   Vitals reviewed.        LABS AND IMAGING      Assessment  Assessment/Plan   Problem List Items Addressed This Visit     None      Visit Diagnoses     Hypothyroidism, unspecified type    -  Primary          Plan  Checks labs today. Continue levothyroxine 300mcg daily. Discussed proper way to take the medication. I misses a dose, can take 2 the following day.   F/u 6 months with Dr Thibodeaux, labs again prior to appt.      "

## 2018-06-14 ENCOUNTER — TELEPHONE (OUTPATIENT)
Dept: ENDOCRINOLOGY | Facility: CLINIC | Age: 33
End: 2018-06-14

## 2018-06-14 RX ORDER — LEVOTHYROXINE SODIUM 0.05 MG/1
50 TABLET ORAL DAILY
Qty: 30 TABLET | Refills: 5 | OUTPATIENT
Start: 2018-06-14 | End: 2019-04-17

## 2018-06-14 NOTE — TELEPHONE ENCOUNTER
----- Message from Ciaran Zamora PA-C sent at 6/13/2018  5:14 PM EDT -----  Please call pt.  If she's only occasionally missing dose then will increase dose. Please send additional 50mcg daily to pharmacy (in addition to 300mcg she's already taking). Very important that she take her medicine regularly and not miss doses. If she does miss a dose, can take 2 the following day. Medicine needs to be taken on empty stomach and wait 30-60 minutes to have food or hot liquids. Needs to be taken 4h apart from iron or calcium.

## 2019-04-17 ENCOUNTER — APPOINTMENT (OUTPATIENT)
Dept: CT IMAGING | Facility: HOSPITAL | Age: 34
End: 2019-04-17

## 2019-04-17 ENCOUNTER — HOSPITAL ENCOUNTER (EMERGENCY)
Facility: HOSPITAL | Age: 34
Discharge: HOME | End: 2019-04-17
Attending: EMERGENCY MEDICINE | Admitting: EMERGENCY MEDICINE

## 2019-04-17 VITALS
WEIGHT: 184 LBS | HEART RATE: 72 BPM | DIASTOLIC BLOOD PRESSURE: 67 MMHG | OXYGEN SATURATION: 98 % | BODY MASS INDEX: 32.6 KG/M2 | RESPIRATION RATE: 16 BRPM | TEMPERATURE: 98.3 F | SYSTOLIC BLOOD PRESSURE: 118 MMHG | HEIGHT: 63 IN

## 2019-04-17 DIAGNOSIS — M54.2 ANTERIOR NECK PAIN: ICD-10-CM

## 2019-04-17 DIAGNOSIS — Z86.39 HISTORY OF HYPOTHYROIDISM: ICD-10-CM

## 2019-04-17 DIAGNOSIS — Z87.828 HISTORY OF STRANGULATION ASSAULT: Primary | ICD-10-CM

## 2019-04-17 DIAGNOSIS — M35.00 HISTORY OF SJOGREN'S DISEASE (HCC): ICD-10-CM

## 2019-04-17 LAB
BUN BLDA-MCNC: 8 MG/DL (ref 8–26)
CA-I BLDA-SCNC: 1.1 MMOL/L (ref 1.2–1.32)
CHLORIDE BLDA-SCNC: 103 MMOL/L (ref 98–109)
CO2 BLDA-SCNC: 27 MMOL/L (ref 24–29)
CREAT BLDA-MCNC: 0.6 MG/DL (ref 0.6–1.3)
GLUCOSE BLDC GLUCOMTR-MCNC: 92 MG/DL (ref 70–130)
HCT VFR BLDA CALC: 36 % (ref 38–51)
HGB BLDA-MCNC: 12.2 G/DL (ref 12–17)
POTASSIUM BLDA-SCNC: 4.1 MMOL/L (ref 3.5–4.9)
SODIUM BLDA-SCNC: 140 MMOL/L (ref 138–146)

## 2019-04-17 PROCEDURE — 85014 HEMATOCRIT: CPT

## 2019-04-17 PROCEDURE — 0 IOPAMIDOL PER 1 ML: Performed by: EMERGENCY MEDICINE

## 2019-04-17 PROCEDURE — 80047 BASIC METABLC PNL IONIZED CA: CPT

## 2019-04-17 PROCEDURE — 70498 CT ANGIOGRAPHY NECK: CPT

## 2019-04-17 PROCEDURE — 99283 EMERGENCY DEPT VISIT LOW MDM: CPT

## 2019-04-17 RX ORDER — SODIUM CHLORIDE 0.9 % (FLUSH) 0.9 %
10 SYRINGE (ML) INJECTION AS NEEDED
Status: DISCONTINUED | OUTPATIENT
Start: 2019-04-17 | End: 2019-04-18 | Stop reason: HOSPADM

## 2019-04-17 RX ADMIN — IOPAMIDOL 75 ML: 755 INJECTION, SOLUTION INTRAVENOUS at 21:03

## 2019-05-06 ENCOUNTER — OFFICE VISIT (OUTPATIENT)
Dept: INTERNAL MEDICINE | Facility: CLINIC | Age: 34
End: 2019-05-06

## 2019-05-06 VITALS
HEART RATE: 63 BPM | WEIGHT: 193.2 LBS | SYSTOLIC BLOOD PRESSURE: 100 MMHG | OXYGEN SATURATION: 99 % | BODY MASS INDEX: 34.22 KG/M2 | DIASTOLIC BLOOD PRESSURE: 70 MMHG

## 2019-05-06 DIAGNOSIS — R79.89 ABNORMAL CBC: ICD-10-CM

## 2019-05-06 DIAGNOSIS — M35.1 MIXED CONNECTIVE TISSUE DISEASE (HCC): Primary | ICD-10-CM

## 2019-05-06 LAB
BASOPHILS # BLD AUTO: 0.02 10*3/MM3 (ref 0–0.2)
BASOPHILS NFR BLD AUTO: 0.6 % (ref 0–1.5)
DEPRECATED RDW RBC AUTO: 43.5 FL (ref 37–54)
EOSINOPHIL # BLD AUTO: 0.05 10*3/MM3 (ref 0–0.4)
EOSINOPHIL NFR BLD AUTO: 1.5 % (ref 0.3–6.2)
ERYTHROCYTE [DISTWIDTH] IN BLOOD BY AUTOMATED COUNT: 14.2 % (ref 12.3–15.4)
HCT VFR BLD AUTO: 34.2 % (ref 34–46.6)
HGB BLD-MCNC: 10.7 G/DL (ref 12–15.9)
IMM GRANULOCYTES # BLD AUTO: 0.01 10*3/MM3 (ref 0–0.05)
IMM GRANULOCYTES NFR BLD AUTO: 0.3 % (ref 0–0.5)
LYMPHOCYTES # BLD AUTO: 1.58 10*3/MM3 (ref 0.7–3.1)
LYMPHOCYTES NFR BLD AUTO: 47.6 % (ref 19.6–45.3)
MCH RBC QN AUTO: 26.4 PG (ref 26.6–33)
MCHC RBC AUTO-ENTMCNC: 31.3 G/DL (ref 31.5–35.7)
MCV RBC AUTO: 84.4 FL (ref 79–97)
MONOCYTES # BLD AUTO: 0.24 10*3/MM3 (ref 0.1–0.9)
MONOCYTES NFR BLD AUTO: 7.2 % (ref 5–12)
NEUTROPHILS # BLD AUTO: 1.42 10*3/MM3 (ref 1.7–7)
NEUTROPHILS NFR BLD AUTO: 42.8 % (ref 42.7–76)
NRBC BLD AUTO-RTO: 0 /100 WBC (ref 0–0.2)
PLATELET # BLD AUTO: 177 10*3/MM3 (ref 140–450)
PMV BLD AUTO: 12.4 FL (ref 6–12)
RBC # BLD AUTO: 4.05 10*6/MM3 (ref 3.77–5.28)
WBC NRBC COR # BLD: 3.32 10*3/MM3 (ref 3.4–10.8)

## 2019-05-06 PROCEDURE — 99213 OFFICE O/P EST LOW 20 MIN: CPT | Performed by: INTERNAL MEDICINE

## 2019-05-06 PROCEDURE — 85025 COMPLETE CBC W/AUTO DIFF WBC: CPT | Performed by: INTERNAL MEDICINE

## 2019-05-06 NOTE — PROGRESS NOTES
Subjective   Yuki Conner is a 33 y.o. female.   Chief Complaint   Patient presents with   • mixed connective tissue disorder     Follow Up       History of Present Illness   Mixed connective tissue disorder. She has not followed up with Rheum secondary to inusrance problems but now has appt prashanth next month. White count and and hemoglobin have been low. Was going to see hematology for f/u but has not gone.  The following portions of the patient's history were reviewed and updated as appropriate: allergies, current medications, past family history, past medical history, past social history, past surgical history and problem list.    Review of Systems   Constitutional: Negative for activity change, appetite change, chills, diaphoresis, fatigue, fever and unexpected weight change.   HENT: Negative for congestion, ear discharge, ear pain, mouth sores, nosebleeds, sinus pressure, sneezing and sore throat.    Eyes: Negative for pain, discharge and itching.   Respiratory: Negative for cough, chest tightness, shortness of breath and wheezing.    Cardiovascular: Negative for chest pain, palpitations and leg swelling.   Gastrointestinal: Negative for abdominal pain, constipation, diarrhea, nausea and vomiting.   Endocrine: Negative for cold intolerance, heat intolerance, polydipsia and polyphagia.   Genitourinary: Negative for dysuria, flank pain, frequency, hematuria and urgency.   Musculoskeletal: Negative for arthralgias, back pain, gait problem, myalgias, neck pain and neck stiffness.   Skin: Negative for color change, pallor and rash.   Neurological: Negative for seizures, speech difficulty, numbness and headaches.   Psychiatric/Behavioral: Negative for agitation, confusion, decreased concentration and sleep disturbance. The patient is not nervous/anxious.      /70   Pulse 63   Wt 87.6 kg (193 lb 3.2 oz)   LMP 04/13/2019   SpO2 99%   BMI 34.22 kg/m²     Objective   Physical Exam   Constitutional: She is  oriented to person, place, and time. She appears well-developed.   HENT:   Head: Normocephalic.   Right Ear: External ear normal.   Left Ear: External ear normal.   Nose: Nose normal.   Mouth/Throat: Oropharynx is clear and moist.   Eyes: Conjunctivae are normal. Pupils are equal, round, and reactive to light.   Neck: No JVD present. No thyromegaly present.   Cardiovascular: Normal rate, regular rhythm and normal heart sounds. Exam reveals no friction rub.   No murmur heard.  Pulmonary/Chest: Effort normal and breath sounds normal. No respiratory distress. She has no wheezes. She has no rales.   Abdominal: Soft. Bowel sounds are normal. She exhibits no distension. There is no tenderness. There is no guarding.   Musculoskeletal: She exhibits no edema or tenderness.   Lymphadenopathy:     She has no cervical adenopathy.   Neurological: She is oriented to person, place, and time. She displays normal reflexes. No cranial nerve deficit.   Skin: No rash noted.   Psychiatric: Her behavior is normal.   Nursing note and vitals reviewed.      Assessment/Plan   Yuki was seen today for mixed connective tissue disorder.    Diagnoses and all orders for this visit:    Mixed connective tissue disease (CMS/[facility])  -     CBC & Differential  -     CBC Auto Differential    Abnormal CBC  Repeat cbc

## 2019-05-14 ENCOUNTER — OFFICE VISIT (OUTPATIENT)
Dept: ENDOCRINOLOGY | Facility: CLINIC | Age: 34
End: 2019-05-14

## 2019-05-14 VITALS
WEIGHT: 183.3 LBS | HEIGHT: 63 IN | OXYGEN SATURATION: 99 % | SYSTOLIC BLOOD PRESSURE: 100 MMHG | DIASTOLIC BLOOD PRESSURE: 68 MMHG | BODY MASS INDEX: 32.48 KG/M2 | HEART RATE: 70 BPM

## 2019-05-14 DIAGNOSIS — E03.9 ACQUIRED HYPOTHYROIDISM: Primary | ICD-10-CM

## 2019-05-14 PROCEDURE — 99213 OFFICE O/P EST LOW 20 MIN: CPT | Performed by: INTERNAL MEDICINE

## 2019-05-14 PROCEDURE — 84439 ASSAY OF FREE THYROXINE: CPT | Performed by: INTERNAL MEDICINE

## 2019-05-14 PROCEDURE — 84443 ASSAY THYROID STIM HORMONE: CPT | Performed by: INTERNAL MEDICINE

## 2019-05-14 NOTE — PROGRESS NOTES
"Chief complaint  Hypothyroidism (Follow UP)    Subjective   Yuki Conner is a 33 y.o. female is here today for follow-up of hypothyroidism     Hypothyroidism:  dx 2009. She has difficult to control disease, likely due to non compliance.   Last TSH was > 150 at her last visit and we have increased the dose to 300 mcg she reportyed being compliant wit the medication and takes it consistently. No other meds, no GI symptoms.     Medications    Current Outpatient Medications:   •  levothyroxine (SYNTHROID, LEVOTHROID) 300 MCG tablet, Take 1 tablet by mouth Daily., Disp: 30 tablet, Rfl: 5    PMH  The following portions of the patient's history were reviewed and updated as appropriate: allergies, current medications, past family history, past medical history, past social history, past surgical history and problem list.    Review of systems  Review of Systems   Constitutional: Positive for fatigue and unexpected weight gain.   All other systems reviewed and are negative.      Physical exam  Objective   Blood pressure 100/68, pulse 70, height 160 cm (63\"), weight 83.1 kg (183 lb 4.8 oz), SpO2 99 %, unknown if currently breastfeeding. Body mass index is 32.47 kg/m².  Physical Exam   Constitutional: She is oriented to person, place, and time. She appears well-developed and well-nourished.   HENT:   Head: Normocephalic and atraumatic.   Eyes: Conjunctivae are normal.   Neck: No thyromegaly present.   Cardiovascular: Normal rate, regular rhythm, normal heart sounds and intact distal pulses.   Pulmonary/Chest: Effort normal and breath sounds normal.   Musculoskeletal: She exhibits no edema.   Lymphadenopathy:     She has no cervical adenopathy.   Neurological: She is alert and oriented to person, place, and time.   Psychiatric: She has a normal mood and affect. Thought content normal.         LABS AND IMAGING  Office Visit on 05/06/2019   Component Date Value Ref Range Status   • WBC 05/06/2019 3.32* 3.40 - 10.80 10*3/mm3 " Final   • RBC 05/06/2019 4.05  3.77 - 5.28 10*6/mm3 Final   • Hemoglobin 05/06/2019 10.7* 12.0 - 15.9 g/dL Final   • Hematocrit 05/06/2019 34.2  34.0 - 46.6 % Final   • MCV 05/06/2019 84.4  79.0 - 97.0 fL Final   • MCH 05/06/2019 26.4* 26.6 - 33.0 pg Final   • MCHC 05/06/2019 31.3* 31.5 - 35.7 g/dL Final   • RDW 05/06/2019 14.2  12.3 - 15.4 % Final   • RDW-SD 05/06/2019 43.5  37.0 - 54.0 fl Final   • MPV 05/06/2019 12.4* 6.0 - 12.0 fL Final   • Platelets 05/06/2019 177  140 - 450 10*3/mm3 Final   • Neutrophil % 05/06/2019 42.8  42.7 - 76.0 % Final   • Lymphocyte % 05/06/2019 47.6* 19.6 - 45.3 % Final   • Monocyte % 05/06/2019 7.2  5.0 - 12.0 % Final   • Eosinophil % 05/06/2019 1.5  0.3 - 6.2 % Final   • Basophil % 05/06/2019 0.6  0.0 - 1.5 % Final   • Immature Grans % 05/06/2019 0.3  0.0 - 0.5 % Final   • Neutrophils, Absolute 05/06/2019 1.42* 1.70 - 7.00 10*3/mm3 Final   • Lymphocytes, Absolute 05/06/2019 1.58  0.70 - 3.10 10*3/mm3 Final   • Monocytes, Absolute 05/06/2019 0.24  0.10 - 0.90 10*3/mm3 Final   • Eosinophils, Absolute 05/06/2019 0.05  0.00 - 0.40 10*3/mm3 Final   • Basophils, Absolute 05/06/2019 0.02  0.00 - 0.20 10*3/mm3 Final   • Immature Grans, Absolute 05/06/2019 0.01  0.00 - 0.05 10*3/mm3 Final   • nRBC 05/06/2019 0.0  0.0 - 0.2 /100 WBC Final   Admission on 04/17/2019, Discharged on 04/17/2019   Component Date Value Ref Range Status   • Glucose 04/17/2019 92  70 - 130 mg/dL Final   • BUN 04/17/2019 8  8 - 26 mg/dL Final   • Creatinine 04/17/2019 0.60  0.60 - 1.30 mg/dL Final   • Sodium 04/17/2019 140  138 - 146 mmol/L Final   • Potassium 04/17/2019 4.1  3.5 - 4.9 mmol/L Final   • Chloride 04/17/2019 103  98 - 109 mmol/L Final   • Total CO2 04/17/2019 27  24 - 29 mmol/L Final   • Hemoglobin 04/17/2019 12.2  12.0 - 17.0 g/dL Final    Serial Number: [id number]:  [id number]   • Hematocrit 04/17/2019 36* 38 - 51 % Final   • Ionized Calcium 04/17/2019 1.10* 1.20 - 1.32 mmol/L Final            Assessment  Assessment/Plan   1. Acquired hypothyroidism        Plan  In the past patient had difficult to control hypothyroidism likely due to the absorption issues. After review of labs today if thyroid function is uncontrolled, will change to bran dname Synthroid or tirosint.   Meds refilled  Follow-up in 6 months.

## 2019-05-15 LAB
T4 FREE SERPL-MCNC: 0.75 NG/DL (ref 0.93–1.7)
TSH SERPL DL<=0.05 MIU/L-ACNC: 20.2 MIU/ML (ref 0.27–4.2)

## 2019-05-22 RX ORDER — LEVOTHYROXINE SODIUM 150 UG/1
CAPSULE ORAL
Qty: 60 CAPSULE | Refills: 5 | Status: SHIPPED | OUTPATIENT
Start: 2019-05-22 | End: 2020-08-19 | Stop reason: SDUPTHER

## 2019-05-23 ENCOUNTER — TELEPHONE (OUTPATIENT)
Dept: ENDOCRINOLOGY | Facility: CLINIC | Age: 34
End: 2019-05-23

## 2019-06-20 ENCOUNTER — OFFICE VISIT (OUTPATIENT)
Dept: INTERNAL MEDICINE | Facility: CLINIC | Age: 34
End: 2019-06-20

## 2019-06-20 VITALS
OXYGEN SATURATION: 99 % | HEART RATE: 64 BPM | SYSTOLIC BLOOD PRESSURE: 90 MMHG | WEIGHT: 190 LBS | DIASTOLIC BLOOD PRESSURE: 70 MMHG | BODY MASS INDEX: 33.66 KG/M2

## 2019-06-20 DIAGNOSIS — R79.89 ABNORMAL CBC: Primary | ICD-10-CM

## 2019-06-20 LAB
BASOPHILS # BLD AUTO: 0.01 10*3/MM3 (ref 0–0.2)
BASOPHILS NFR BLD AUTO: 0.4 % (ref 0–1.5)
DEPRECATED RDW RBC AUTO: 48.2 FL (ref 37–54)
EOSINOPHIL # BLD AUTO: 0.06 10*3/MM3 (ref 0–0.4)
EOSINOPHIL NFR BLD AUTO: 2.5 % (ref 0.3–6.2)
ERYTHROCYTE [DISTWIDTH] IN BLOOD BY AUTOMATED COUNT: 15.3 % (ref 12.3–15.4)
HCT VFR BLD AUTO: 35 % (ref 34–46.6)
HGB BLD-MCNC: 10.7 G/DL (ref 12–15.9)
IMM GRANULOCYTES # BLD AUTO: 0 10*3/MM3 (ref 0–0.05)
IMM GRANULOCYTES NFR BLD AUTO: 0 % (ref 0–0.5)
LYMPHOCYTES # BLD AUTO: 1.08 10*3/MM3 (ref 0.7–3.1)
LYMPHOCYTES NFR BLD AUTO: 45.6 % (ref 19.6–45.3)
MCH RBC QN AUTO: 26.4 PG (ref 26.6–33)
MCHC RBC AUTO-ENTMCNC: 30.6 G/DL (ref 31.5–35.7)
MCV RBC AUTO: 86.2 FL (ref 79–97)
MONOCYTES # BLD AUTO: 0.25 10*3/MM3 (ref 0.1–0.9)
MONOCYTES NFR BLD AUTO: 10.5 % (ref 5–12)
NEUTROPHILS # BLD AUTO: 0.97 10*3/MM3 (ref 1.7–7)
NEUTROPHILS NFR BLD AUTO: 41 % (ref 42.7–76)
NRBC BLD AUTO-RTO: 0 /100 WBC (ref 0–0.2)
PLATELET # BLD AUTO: 169 10*3/MM3 (ref 140–450)
PMV BLD AUTO: 12.1 FL (ref 6–12)
RBC # BLD AUTO: 4.06 10*6/MM3 (ref 3.77–5.28)
WBC NRBC COR # BLD: 2.37 10*3/MM3 (ref 3.4–10.8)

## 2019-06-20 PROCEDURE — 99213 OFFICE O/P EST LOW 20 MIN: CPT | Performed by: INTERNAL MEDICINE

## 2019-06-20 PROCEDURE — 85025 COMPLETE CBC W/AUTO DIFF WBC: CPT | Performed by: INTERNAL MEDICINE
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
## 2019-07-02 DIAGNOSIS — D72.819 LEUKOPENIA, UNSPECIFIED TYPE: Primary | ICD-10-CM

## 2019-08-12 ENCOUNTER — CONSULT (OUTPATIENT)
Dept: ONCOLOGY | Facility: CLINIC | Age: 34
End: 2019-08-12

## 2019-08-12 ENCOUNTER — LAB (OUTPATIENT)
Dept: LAB | Facility: HOSPITAL | Age: 34
End: 2019-08-12

## 2019-08-12 VITALS
SYSTOLIC BLOOD PRESSURE: 109 MMHG | RESPIRATION RATE: 16 BRPM | BODY MASS INDEX: 30.99 KG/M2 | DIASTOLIC BLOOD PRESSURE: 71 MMHG | HEIGHT: 65 IN | OXYGEN SATURATION: 100 % | HEART RATE: 67 BPM | WEIGHT: 186 LBS | TEMPERATURE: 97.3 F

## 2019-08-12 DIAGNOSIS — D70.8 OTHER NEUTROPENIA (HCC): ICD-10-CM

## 2019-08-12 DIAGNOSIS — D70.8 OTHER NEUTROPENIA (HCC): Primary | ICD-10-CM

## 2019-08-12 LAB
ERYTHROCYTE [DISTWIDTH] IN BLOOD BY AUTOMATED COUNT: 15.6 % (ref 12.3–15.4)
HCT VFR BLD AUTO: 35.6 % (ref 34–46.6)
HGB BLD-MCNC: 11.4 G/DL (ref 12–15.9)
LYMPHOCYTES # BLD AUTO: 1 10*3/MM3 (ref 0.7–3.1)
LYMPHOCYTES NFR BLD AUTO: 44.2 % (ref 19.6–45.3)
MCH RBC QN AUTO: 27.8 PG (ref 26.6–33)
MCHC RBC AUTO-ENTMCNC: 32.1 G/DL (ref 31.5–35.7)
MCV RBC AUTO: 86.6 FL (ref 79–97)
MONOCYTES # BLD AUTO: 0.2 10*3/MM3 (ref 0.1–0.9)
MONOCYTES NFR BLD AUTO: 8.7 % (ref 5–12)
NEUTROPHILS # BLD AUTO: 1.1 10*3/MM3 (ref 1.7–7)
NEUTROPHILS NFR BLD AUTO: 47.1 % (ref 42.7–76)
PLATELET # BLD AUTO: 159 10*3/MM3 (ref 140–450)
PMV BLD AUTO: 7.7 FL (ref 6–12)
RBC # BLD AUTO: 4.11 10*6/MM3 (ref 3.77–5.28)
WBC NRBC COR # BLD: 2.3 10*3/MM3 (ref 3.4–10.8)

## 2019-08-12 PROCEDURE — 85025 COMPLETE CBC W/AUTO DIFF WBC: CPT

## 2019-08-12 PROCEDURE — 86038 ANTINUCLEAR ANTIBODIES: CPT

## 2019-08-12 PROCEDURE — 86225 DNA ANTIBODY NATIVE: CPT

## 2019-08-12 PROCEDURE — 36415 COLL VENOUS BLD VENIPUNCTURE: CPT

## 2019-08-12 PROCEDURE — 99203 OFFICE O/P NEW LOW 30 MIN: CPT | Performed by: INTERNAL MEDICINE

## 2019-08-12 NOTE — PROGRESS NOTES
ID: 34 y.o. year old female from [place] [id number]    PCP: Kami Diaz DO    REFERRING PHYSICIAN: Kami Diaz DO    Reason for Consultation: Persistent neutropenia    Dear Dr. Diaz    It is a pleasure to meet Ms. Conner today.  As you know she is a very pleasant 34-year-old lady I am seeing in consultation for persistent neutropenia.  Looking back in her history it appears that it has been ongoing for at least 5 years in our records.  It possibly could be longer.  In the past there was some concern that she may have an underlying autoimmune process.  She has never been infected during this period of time.  Denies any issues with pain.  She is adopted she though she does not know her family history.        Past Medical History:   Diagnosis Date   • Anemia    • Disease of thyroid gland    • Elevated LFTs 9/27/2016   • Fatigue 9/27/2016   • Sinusitis 9/27/2016   • Sjogren's disease (CMS/HCC) 9/27/2016       Past Surgical History:   Procedure Laterality Date   • ADENOIDECTOMY     • TONSILLECTOMY     • WISDOM TOOTH EXTRACTION         Social History     Socioeconomic History   • Marital status: Single     Spouse name: Not on file   • Number of children: Not on file   • Years of education: Not on file   • Highest education level: Not on file   Tobacco Use   • Smoking status: Never Smoker   • Smokeless tobacco: Never Used   Substance and Sexual Activity   • Alcohol use: Yes     Comment: social drinker   • Drug use: No   • Sexual activity: Defer     Partners: Male       Family History   Adopted: Yes   Family history unknown: Yes       Review of Systems:    16 point review of systems was performed and reviewed and scanned into the EMR    Review of Systems - Oncology      Current Outpatient Medications:   •  levothyroxine sodium (TIROSINT) 150 MCG capsule, 2 PO Daily to = 300mcg, Disp: 60 capsule, Rfl: 5    No Known Allergies    ECOG SCORE: 0    Objective     Vitals:    08/12/19 1056   BP: 109/71   Pulse: 67    Resp: 16   Temp: 97.3 °F (36.3 °C)   SpO2: 100%     Body mass index is 31.43 kg/m².  Body surface area is 1.91 meters squared.        08/12/19  1056   Weight: 84.4 kg (186 lb)         Physical Exam    General: well appearing, in no acute distress  HEENT: sclera anicteric, oropharynx clear, neck is supple  Lymphatics: no cervical, supraclavicular, or axillary adenopathy  Cardiovascular: regular rate and rhythm, no murmurs, rubs or gallops  Lungs: clear to auscultation bilaterally  Abdomen: soft, nontender, nondistended.  No palpable organomegaly  Extremities: no lower extremity edema  Skin: no rashes, lesions, bruising, or petechiae  Msk:  Shows no weakness of the large muscle groups  Psych: Mood is stable      Lab Results   Component Value Date    GLUCOSE 92 06/13/2018    BUN 15 06/13/2018    CREATININE 0.60 04/17/2019     06/13/2018    K 3.8 06/13/2018     06/13/2018    CO2 27.0 06/13/2018    CALCIUM 8.7 06/13/2018    PROTEINTOT 8.4 (H) 06/13/2018    ALBUMIN 4.39 06/13/2018    BILITOT 0.7 06/13/2018    ALKPHOS 40 06/13/2018    AST 20 06/13/2018    ALT 16 06/13/2018       Lab Results   Component Value Date    HGB 11.4 (L) 08/12/2019    HCT 35.6 08/12/2019    MCV 86.6 08/12/2019     08/12/2019    WBC 2.30 (L) 08/12/2019    NEUTROABS 1.10 (L) 08/12/2019    LYMPHSABS 1.00 08/12/2019    MONOSABS 0.20 08/12/2019    EOSABS 0.06 06/20/2019    BASOSABS 0.01 06/20/2019       Ct Angiogram Neck With & Without Contrast    Result Date: 4/17/2019  Negative CT angiogram of the neck. Signer Name: Scott Bello MD  Signed: 4/17/2019 9:41 PM  Workstation Name: [id number]           ASSESSMENT:    1.  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  The timeline is very consistent with this process.  She has had it for a number of years with no changes.  I am going to check an ROSA MARIA titer just to make sure she does not have an autoimmune process.  Regardless I would not consider any treatment for it since she is  completely asymptomatic.  I will check on her again in 1 year to make sure this is stable.  If she has any issues or concerns in the interim she can always give me a call.  I educated her on the cause of this.  It usually is related to the Rivero antigen receptor chemokine and it likely is a protective evolutionary change secondary to malaria.      I spent a total of 30 minutes in direct patient care, greater than 25 minutes (greater than 50%) were spent in coordination of care, and counseling the patient regarding neutropenia. Answered any questions patient had with her plan.          Thank you for allowing me to participate in the care of this patient.    Yours sincerely,    Reji Mahoney MD  [facility]  Hematology and Oncology    Return in (Approximately): 1 year    Orders Placed This Encounter   Procedures   • ROSA MARIA     Standing Status:   Future     Number of Occurrences:   1     Standing Expiration Date:   8/12/2020   • CBC & Differential     Standing Status:   Future     Standing Expiration Date:   8/12/2020     Order Specific Question:   Manual Differential     Answer:   No   • CBC & Differential     Standing Status:   Future     Number of Occurrences:   1     Standing Expiration Date:   12/12/2020     Order Specific Question:   Manual Differential     Answer:   No         Please note that portions of this note may have been completed with a voice recognition program. Efforts were made to edit the dictations, but occasionally words are mistranscribed.

## 2019-08-14 LAB
ANA SER QL: POSITIVE
DSDNA AB SER-ACNC: <1 IU/ML (ref 0–9)
Lab: NORMAL

## 2019-08-16 ENCOUNTER — TELEPHONE (OUTPATIENT)
Dept: ONCOLOGY | Facility: CLINIC | Age: 34
End: 2019-08-16

## 2019-08-16 NOTE — TELEPHONE ENCOUNTER
Called patient per Dr. Garcia  Request and left message on VM. notifying  patient that her ROSA MARIA had came back positive. Informing her to call with any questions.

## 2020-08-05 ENCOUNTER — TELEPHONE (OUTPATIENT)
Dept: ONCOLOGY | Facility: CLINIC | Age: 35
End: 2020-08-05

## 2020-08-05 NOTE — TELEPHONE ENCOUNTER
Called patient and left message to see if we could switch her appointment to Friday August 14 at 11:45. Informing  Patient to call us back.

## 2020-08-19 NOTE — TELEPHONE ENCOUNTER
Dr. Thibodeaux, please advise on refill.  Thank you.    LOV:  05/14/19    Last refill:  05/22/19  DORIE Diane

## 2020-08-24 RX ORDER — LEVOTHYROXINE SODIUM 150 UG/1
CAPSULE ORAL
Qty: 60 CAPSULE | Refills: 5 | Status: SHIPPED | OUTPATIENT
Start: 2020-08-24 | End: 2021-01-29 | Stop reason: SDUPTHER

## 2020-08-28 ENCOUNTER — OFFICE VISIT (OUTPATIENT)
Dept: INTERNAL MEDICINE | Facility: CLINIC | Age: 35
End: 2020-08-28

## 2020-08-28 ENCOUNTER — LAB (OUTPATIENT)
Dept: LAB | Facility: HOSPITAL | Age: 35
End: 2020-08-28

## 2020-08-28 VITALS
SYSTOLIC BLOOD PRESSURE: 102 MMHG | DIASTOLIC BLOOD PRESSURE: 64 MMHG | HEIGHT: 63 IN | BODY MASS INDEX: 32.71 KG/M2 | TEMPERATURE: 97.8 F | WEIGHT: 184.6 LBS

## 2020-08-28 DIAGNOSIS — Z00.00 HEALTHCARE MAINTENANCE: ICD-10-CM

## 2020-08-28 DIAGNOSIS — Z00.00 HEALTHCARE MAINTENANCE: Primary | ICD-10-CM

## 2020-08-28 LAB
ALBUMIN SERPL-MCNC: 4.3 G/DL (ref 3.5–5.2)
ALBUMIN/GLOB SERPL: 1.1 G/DL
ALP SERPL-CCNC: 32 U/L (ref 39–117)
ALT SERPL W P-5'-P-CCNC: 15 U/L (ref 1–33)
ANION GAP SERPL CALCULATED.3IONS-SCNC: 8.4 MMOL/L (ref 5–15)
AST SERPL-CCNC: 16 U/L (ref 1–32)
BILIRUB SERPL-MCNC: 0.5 MG/DL (ref 0–1.2)
BUN SERPL-MCNC: 10 MG/DL (ref 6–20)
BUN/CREAT SERPL: 14.9 (ref 7–25)
CALCIUM SPEC-SCNC: 8.8 MG/DL (ref 8.6–10.5)
CHLORIDE SERPL-SCNC: 104 MMOL/L (ref 98–107)
CHOLEST SERPL-MCNC: 177 MG/DL (ref 0–200)
CO2 SERPL-SCNC: 24.6 MMOL/L (ref 22–29)
CREAT SERPL-MCNC: 0.67 MG/DL (ref 0.57–1)
GFR SERPL CREATININE-BSD FRML MDRD: 123 ML/MIN/1.73
GLOBULIN UR ELPH-MCNC: 4 GM/DL
GLUCOSE SERPL-MCNC: 81 MG/DL (ref 65–99)
HDLC SERPL-MCNC: 55 MG/DL (ref 40–60)
LDLC SERPL CALC-MCNC: 113 MG/DL (ref 0–100)
LDLC/HDLC SERPL: 2.05 {RATIO}
POTASSIUM SERPL-SCNC: 4.2 MMOL/L (ref 3.5–5.2)
PROT SERPL-MCNC: 8.3 G/DL (ref 6–8.5)
SODIUM SERPL-SCNC: 137 MMOL/L (ref 136–145)
TRIGL SERPL-MCNC: 45 MG/DL (ref 0–150)
TSH SERPL DL<=0.05 MIU/L-ACNC: 90.1 UIU/ML (ref 0.27–4.2)
VLDLC SERPL-MCNC: 9 MG/DL (ref 5–40)

## 2020-08-28 PROCEDURE — 80053 COMPREHEN METABOLIC PANEL: CPT | Performed by: INTERNAL MEDICINE

## 2020-08-28 PROCEDURE — 84443 ASSAY THYROID STIM HORMONE: CPT | Performed by: INTERNAL MEDICINE

## 2020-08-28 PROCEDURE — 85025 COMPLETE CBC W/AUTO DIFF WBC: CPT | Performed by: INTERNAL MEDICINE

## 2020-08-28 PROCEDURE — 80061 LIPID PANEL: CPT | Performed by: INTERNAL MEDICINE

## 2020-08-28 PROCEDURE — 99395 PREV VISIT EST AGE 18-39: CPT | Performed by: INTERNAL MEDICINE

## 2020-08-28 NOTE — PROGRESS NOTES
Chief Complaint   Patient presents with   • Hypothyroidism   • Annual Exam         Reported Health  Good Yes  FairNo  PoorNo      Dental,Vision,Hearing  Regular dental visitsYes  Vision ProblemsNo  Hearing LossNo      Immunization Status:  Up To DateYes        Lifestyle  Healthy DietYes  Weight ConcernsYes  Regular ExerciseYes  Tobacco UseNo  Alcohol UseYes  Drug AbuseNo      Screening  Cancer ScreeningYes  Metabolic ScreeningYes  Risk ScreeningYes  Past Medical History:   Diagnosis Date   • Anemia    • Disease of thyroid gland    • Elevated LFTs 9/27/2016   • Fatigue 9/27/2016   • Sinusitis 9/27/2016   • Sjogren's disease (CMS/HCC) 9/27/2016     Past Surgical History:   Procedure Laterality Date   • ADENOIDECTOMY     • TONSILLECTOMY     • TUBAL ABDOMINAL LIGATION     • WISDOM TOOTH EXTRACTION       Family History   Adopted: Yes   Family history unknown: Yes     Social History     Socioeconomic History   • Marital status: Single     Spouse name: Not on file   • Number of children: Not on file   • Years of education: Not on file   • Highest education level: Not on file   Tobacco Use   • Smoking status: Never Smoker   • Smokeless tobacco: Never Used   Substance and Sexual Activity   • Alcohol use: Yes     Comment: social drinker   • Drug use: No   • Sexual activity: Defer     Partners: Male         Review of Systems   Constitutional: Negative for activity change, appetite change, chills, diaphoresis, fatigue, fever and unexpected weight change.   HENT: Negative for congestion, ear discharge, ear pain, mouth sores, nosebleeds, sinus pressure, sneezing and sore throat.    Eyes: Negative for pain, discharge and itching.   Respiratory: Negative for cough, chest tightness, shortness of breath and wheezing.    Cardiovascular: Negative for chest pain, palpitations and leg swelling.   Gastrointestinal: Negative for abdominal pain, constipation, diarrhea, nausea and vomiting.   Endocrine: Negative for cold intolerance, heat  "intolerance, polydipsia and polyphagia.   Genitourinary: Negative for dysuria, flank pain, frequency, hematuria and urgency.   Musculoskeletal: Negative for arthralgias, back pain, gait problem, myalgias, neck pain and neck stiffness.   Skin: Negative for color change, pallor and rash.   Neurological: Negative for seizures, speech difficulty, numbness and headaches.   Psychiatric/Behavioral: Negative for agitation, confusion, decreased concentration and sleep disturbance. The patient is not nervous/anxious.    /64   Temp 97.8 °F (36.6 °C)   Ht 160 cm (63\")   Wt 83.7 kg (184 lb 9.6 oz)   LMP 08/14/2020 (Approximate)   BMI 32.70 kg/m²       Physical Exam   Constitutional: She is oriented to person, place, and time. She appears well-developed.   HENT:   Head: Normocephalic.   Right Ear: External ear normal.   Left Ear: External ear normal.   Nose: Nose normal.   Mouth/Throat: Oropharynx is clear and moist.   Eyes: Pupils are equal, round, and reactive to light. Conjunctivae are normal.   Neck: No JVD present. No thyromegaly present.   Cardiovascular: Normal rate, regular rhythm and normal heart sounds. Exam reveals no friction rub.   No murmur heard.  Pulmonary/Chest: Effort normal and breath sounds normal. No respiratory distress. She has no wheezes. She has no rales.   Abdominal: Soft. Bowel sounds are normal. She exhibits no distension. There is no tenderness. There is no guarding.   Musculoskeletal: She exhibits no edema or tenderness.   Lymphadenopathy:     She has no cervical adenopathy.   Neurological: She is alert and oriented to person, place, and time. She displays normal reflexes. No cranial nerve deficit.   Skin: No rash noted.   Psychiatric: Her behavior is normal.   Nursing note and vitals reviewed.                Diet and Exercise    Healthy Diet Yes  Adequate DietNo  Poor DietNo  Adequate Exercise RegimenYes  Inadequate Exercise RegimenNo      Cervical Cancer Screening    Risks and benefits " discussedYes  Screening currentYes  PAP Done Today OrderedNo  Screening Not IndicatedNo  Pap Every 3 yearsYes  Screening Done By GYNYes    Breast Cancer screening  Not indicated      STD Testing  ChlamydiaNo  GonorrheaNo  HIVNo      Osteoporosis Screening  Not indicated  Colorectal Cancer Screening  Not indicated  Metabolic Screening  GlucoseYes  LipidsYes  CBCYes  TSHYes  UAYes  CMPYes  25OHNo      Immunizations  Risks and benefits discussedYes  Immunizations Up To DateYes  Immunizations NeededNo  Immunizations Per OrdersNo  Patient DNoeclines      Preventative Counseling  NutritionYes  Aerobic ExerciseYes  Weight Bearing ExerciseYes  Weight LossYes  Calcium SupplementsYes  Vitamin D SupplementsYes  Reproductive HealthNo  Cardiovascular Risk ReductionYes  Tobacco CessationNo  Alcohol UseYes  Sunscreen UseYes  Self Skin ExaminationYes  Helmet UseYes  Seat Belt UseYes  Fall Risk ReductionNo  Advanced Directive PlanningNo      Patient Discussion  PatientYes  FamilyNo  CounselingYes  Yuki was seen today for hypothyroidism and annual exam.    Diagnoses and all orders for this visit:    Healthcare maintenance  -     CBC & Differential; Future  -     Comprehensive Metabolic Panel; Future  -     Lipid Panel; Future  -     TSH; Future

## 2020-08-29 ENCOUNTER — TELEPHONE (OUTPATIENT)
Dept: INTERNAL MEDICINE | Facility: CLINIC | Age: 35
End: 2020-08-29

## 2020-08-29 LAB
BASOPHILS # BLD AUTO: 0.02 10*3/MM3 (ref 0–0.2)
BASOPHILS NFR BLD AUTO: 1.2 % (ref 0–1.5)
DEPRECATED RDW RBC AUTO: 44.1 FL (ref 37–54)
EOSINOPHIL # BLD AUTO: 0.06 10*3/MM3 (ref 0–0.4)
EOSINOPHIL NFR BLD AUTO: 3.6 % (ref 0.3–6.2)
ERYTHROCYTE [DISTWIDTH] IN BLOOD BY AUTOMATED COUNT: 13.9 % (ref 12.3–15.4)
HCT VFR BLD AUTO: 33.2 % (ref 34–46.6)
HGB BLD-MCNC: 10.1 G/DL (ref 12–15.9)
IMM GRANULOCYTES # BLD AUTO: 0 10*3/MM3 (ref 0–0.05)
IMM GRANULOCYTES NFR BLD AUTO: 0 % (ref 0–0.5)
LYMPHOCYTES # BLD AUTO: 0.85 10*3/MM3 (ref 0.7–3.1)
LYMPHOCYTES NFR BLD AUTO: 50.9 % (ref 19.6–45.3)
MCH RBC QN AUTO: 26.2 PG (ref 26.6–33)
MCHC RBC AUTO-ENTMCNC: 30.4 G/DL (ref 31.5–35.7)
MCV RBC AUTO: 86.2 FL (ref 79–97)
MONOCYTES # BLD AUTO: 0.17 10*3/MM3 (ref 0.1–0.9)
MONOCYTES NFR BLD AUTO: 10.2 % (ref 5–12)
NEUTROPHILS NFR BLD AUTO: 0.57 10*3/MM3 (ref 1.7–7)
NEUTROPHILS NFR BLD AUTO: 34.1 % (ref 42.7–76)
NRBC BLD AUTO-RTO: 0 /100 WBC (ref 0–0.2)
PLATELET # BLD AUTO: 158 10*3/MM3 (ref 140–450)
PMV BLD AUTO: 12.5 FL (ref 6–12)
RBC # BLD AUTO: 3.85 10*6/MM3 (ref 3.77–5.28)
WBC # BLD AUTO: 1.67 10*3/MM3 (ref 3.4–10.8)

## 2020-08-29 NOTE — TELEPHONE ENCOUNTER
Critical Lab call received WBC 1.67. Reviewed patients chart, documented history of leukopenia and chronic benign ethnic neutropenia. No show in chart for previous Hematology follow-up 8/12/20.

## 2020-08-31 ENCOUNTER — TELEPHONE (OUTPATIENT)
Dept: INTERNAL MEDICINE | Facility: CLINIC | Age: 35
End: 2020-08-31

## 2020-08-31 DIAGNOSIS — D70.9 NEUTROPENIA, UNSPECIFIED TYPE (HCC): Primary | ICD-10-CM

## 2020-08-31 NOTE — TELEPHONE ENCOUNTER
----- Message from Kami Diaz DO sent at 8/31/2020 10:01 AM EDT -----  Needs to increase Tiorosint 175 mcg po qd 30 with 1 refill 6 week f/u  WBC critcal low. She is asymptomatic but needs  To get back in with hematology. I will put referral back in.  Anemia. Needs to be on iron supplement. When was last colonoscopy?

## 2020-08-31 NOTE — TELEPHONE ENCOUNTER
Called patient and explained lab result.  Patient states she has an appointment with Dr. Moreland in October for follow up.  Patient advised to start taking iron supplement.  pharmacy for .  Patient states she has never had a colonoscopy.  Instructed Dr. Diaz will order.  Patient verbalized understanding.

## 2020-09-03 ENCOUNTER — OFFICE VISIT (OUTPATIENT)
Dept: ONCOLOGY | Facility: CLINIC | Age: 35
End: 2020-09-03

## 2020-09-03 VITALS
DIASTOLIC BLOOD PRESSURE: 68 MMHG | HEART RATE: 64 BPM | TEMPERATURE: 97.8 F | WEIGHT: 183 LBS | RESPIRATION RATE: 18 BRPM | HEIGHT: 63 IN | OXYGEN SATURATION: 98 % | SYSTOLIC BLOOD PRESSURE: 103 MMHG | BODY MASS INDEX: 32.43 KG/M2

## 2020-09-03 DIAGNOSIS — D70.8 AUTOIMMUNE NEUTROPENIA (HCC): Primary | ICD-10-CM

## 2020-09-03 PROCEDURE — 99213 OFFICE O/P EST LOW 20 MIN: CPT | Performed by: INTERNAL MEDICINE

## 2020-09-03 NOTE — PROGRESS NOTES
"PROBLEM LIST:    1. Autoimmune Neutropenia - mild neutropenia  2. ROSA MARIA positive  3. Hypothyroidism  4.  Anemia of chronic disease likely related to her hypothyroidism      REASON FOR VISIT: Mild neutropenia    HISTORY OF PRESENT ILLNESS:   35 y.o.  female presents today for mild neutropenia.  She does have underlying hypothyroidism.  She also has ROSA MARIA positivity.  She does not have any infections.    Past medical history, social history and family history was reviewed and unchanged from prior visit.    Review of Systems:    Review of Systems - Oncology         Medications:        Current Outpatient Medications:   •  levothyroxine sodium (Tirosint) 150 MCG capsule, 2 PO Daily to = 300mcg, Disp: 60 capsule, Rfl: 5           ALLERGIES:  No Known Allergies      Physical Exam    VITAL SIGNS:  /68 Comment: LUE  Pulse 64   Temp 97.8 °F (36.6 °C) (Temporal)   Resp 18   Ht 160 cm (63\")   Wt 83 kg (183 lb)   LMP 08/14/2020 (Approximate)   SpO2 98% Comment: RA  BMI 32.42 kg/m²     Wt Readings from Last 3 Encounters:   09/03/20 83 kg (183 lb)   08/28/20 83.7 kg (184 lb 9.6 oz)   08/12/19 84.4 kg (186 lb)       Body mass index is 32.42 kg/m². Body surface area is 1.86 meters squared.    Pain Score    09/03/20 1425   PainSc: 0-No pain         Performance Status: 0    General: well appearing, in no acute distress  HEENT: sclera anicteric, neck is supple  Lymphatics: no cervical, supraclavicular, or axillary adenopathy  Cardiovascular: regular rate and rhythm, no murmurs, rubs or gallops  Lungs: clear to auscultation bilaterally  Abdomen: soft, nontender, nondistended.  No palpable organomegaly  Extremities: no lower extremity edema  Skin: no rashes, lesions, bruising, or petechiae  Msk:  Shows no weakness of the large muscle groups  Psych: Mood is stable        RECENT LABS:    Lab Results   Component Value Date    HGB 10.1 (L) 08/28/2020    HCT 33.2 (L) 08/28/2020    MCV 86.2 08/28/2020     08/28/2020    WBC " 1.67 (C) 08/28/2020    NEUTROABS 0.57 (L) 08/28/2020    LYMPHSABS 0.85 08/28/2020    MONOSABS 0.17 08/28/2020    EOSABS 0.06 08/28/2020    BASOSABS 0.02 08/28/2020       Lab Results   Component Value Date    GLUCOSE 81 08/28/2020    BUN 10 08/28/2020    CREATININE 0.67 08/28/2020     08/28/2020    K 4.2 08/28/2020     08/28/2020    CO2 24.6 08/28/2020    CALCIUM 8.8 08/28/2020    PROTEINTOT 8.3 08/28/2020    ALBUMIN 4.30 08/28/2020    BILITOT 0.5 08/28/2020    ALKPHOS 32 (L) 08/28/2020    AST 16 08/28/2020    ALT 15 08/28/2020       Lab Results   Component Value Date    TSH 90.100 (H) 08/28/2020             Assessment/Plan    1.  Mild neutropenia related to ROSA MARIA positivity.  This usually does not cause significant infection risk.  This does not need any immunosuppression.    2.  Normocytic anemia likely related to hypothyroidism.  Recommend correct hypothyroidism and then see if this manages to improve.    3.  Hypothyroidism.  Recently Dr. Diaz had placed the patient on an increased dose of her levothyroxine.          I spent a total of 15 minutes in direct patient care, greater than 10 minutes (greater than 50%) were spent in coordination of care, and counseling the patient regarding  Neutropenia and anemia . Answered any questions patient had with medication and plan.      Reji Mahoney MD  [facility] Hematology and Oncology         No orders of the defined types were placed in this encounter.      9/3/2020

## 2021-01-06 ENCOUNTER — LAB (OUTPATIENT)
Dept: LAB | Facility: HOSPITAL | Age: 36
End: 2021-01-06

## 2021-01-06 ENCOUNTER — LAB (OUTPATIENT)
Dept: ENDOCRINOLOGY | Facility: CLINIC | Age: 36
End: 2021-01-06

## 2021-01-06 DIAGNOSIS — E03.9 ACQUIRED HYPOTHYROIDISM: Primary | ICD-10-CM

## 2021-01-06 DIAGNOSIS — E03.9 ACQUIRED HYPOTHYROIDISM: ICD-10-CM

## 2021-01-06 DIAGNOSIS — D70.8 AUTOIMMUNE NEUTROPENIA (HCC): ICD-10-CM

## 2021-01-06 PROCEDURE — 84443 ASSAY THYROID STIM HORMONE: CPT

## 2021-01-06 PROCEDURE — 36415 COLL VENOUS BLD VENIPUNCTURE: CPT

## 2021-01-06 PROCEDURE — 80053 COMPREHEN METABOLIC PANEL: CPT

## 2021-01-06 PROCEDURE — 84439 ASSAY OF FREE THYROXINE: CPT

## 2021-01-07 LAB
ALBUMIN SERPL-MCNC: 4.4 G/DL (ref 3.5–5.2)
ALBUMIN/GLOB SERPL: 1.1 G/DL
ALP SERPL-CCNC: 52 U/L (ref 39–117)
ALT SERPL W P-5'-P-CCNC: 9 U/L (ref 1–33)
ANION GAP SERPL CALCULATED.3IONS-SCNC: 8.3 MMOL/L (ref 5–15)
AST SERPL-CCNC: 15 U/L (ref 1–32)
BILIRUB SERPL-MCNC: 0.5 MG/DL (ref 0–1.2)
BUN SERPL-MCNC: 10 MG/DL (ref 6–20)
BUN/CREAT SERPL: 15.4 (ref 7–25)
CALCIUM SPEC-SCNC: 8.7 MG/DL (ref 8.6–10.5)
CHLORIDE SERPL-SCNC: 104 MMOL/L (ref 98–107)
CO2 SERPL-SCNC: 26.7 MMOL/L (ref 22–29)
CREAT SERPL-MCNC: 0.65 MG/DL (ref 0.57–1)
GFR SERPL CREATININE-BSD FRML MDRD: 127 ML/MIN/1.73
GLOBULIN UR ELPH-MCNC: 4 GM/DL
GLUCOSE SERPL-MCNC: 67 MG/DL (ref 65–99)
POTASSIUM SERPL-SCNC: 3.8 MMOL/L (ref 3.5–5.2)
PROT SERPL-MCNC: 8.4 G/DL (ref 6–8.5)
SODIUM SERPL-SCNC: 139 MMOL/L (ref 136–145)
T4 FREE SERPL-MCNC: 0.76 NG/DL (ref 0.93–1.7)
TSH SERPL DL<=0.05 MIU/L-ACNC: 36.1 UIU/ML (ref 0.27–4.2)

## 2021-01-14 ENCOUNTER — TELEPHONE (OUTPATIENT)
Dept: ENDOCRINOLOGY | Facility: CLINIC | Age: 36
End: 2021-01-14

## 2021-01-20 ENCOUNTER — TELEPHONE (OUTPATIENT)
Dept: ENDOCRINOLOGY | Facility: CLINIC | Age: 36
End: 2021-01-20

## 2021-01-20 NOTE — TELEPHONE ENCOUNTER
Spoke w/ patient-she cannot come before noon due to work. So I will keep her on my cancellation list for when you open up another day or have a cancellation.

## 2021-01-20 NOTE — TELEPHONE ENCOUNTER
----- Message from Ludivina GABRIEL MD sent at 1/20/2021  3:11 PM EST -----  Can she come Jan 28 in the morning?  15 min appt between u/s should be ok. Or at 11:30 am   ----- Message -----  From: Leila Harman MA  Sent: 1/15/2021  10:22 AM EST  To: Ludivina GABRIEL MD    Patient is working today until 4:30.  Do you have any other days or options?

## 2021-01-29 ENCOUNTER — OFFICE VISIT (OUTPATIENT)
Dept: ENDOCRINOLOGY | Facility: CLINIC | Age: 36
End: 2021-01-29

## 2021-01-29 VITALS
HEART RATE: 75 BPM | WEIGHT: 181.1 LBS | BODY MASS INDEX: 32.09 KG/M2 | TEMPERATURE: 97.7 F | HEIGHT: 63 IN | DIASTOLIC BLOOD PRESSURE: 66 MMHG | SYSTOLIC BLOOD PRESSURE: 100 MMHG

## 2021-01-29 DIAGNOSIS — E03.9 ACQUIRED HYPOTHYROIDISM: Primary | ICD-10-CM

## 2021-01-29 PROCEDURE — 99213 OFFICE O/P EST LOW 20 MIN: CPT | Performed by: INTERNAL MEDICINE

## 2021-01-29 RX ORDER — LEVOTHYROXINE SODIUM 200 UG/1
CAPSULE ORAL
Qty: 2 CAPSULE | Refills: 11 | Status: SHIPPED | OUTPATIENT
Start: 2021-01-29 | End: 2022-08-22 | Stop reason: ALTCHOICE

## 2021-01-29 RX ORDER — LEVOTHYROXINE SODIUM 13 UG/1
300 CAPSULE ORAL DAILY
Qty: 60 CAPSULE | Refills: 11 | Status: SHIPPED | OUTPATIENT
Start: 2021-01-29 | End: 2022-05-27 | Stop reason: SDUPTHER

## 2021-02-05 ENCOUNTER — TELEPHONE (OUTPATIENT)
Dept: ENDOCRINOLOGY | Facility: CLINIC | Age: 36
End: 2021-02-05

## 2021-02-05 NOTE — TELEPHONE ENCOUNTER
----- Message from Ludivina GABRIEL MD sent at 2/5/2021  4:09 PM EST -----  Can we schedule her Feb 16 at 4 pm? If she only can come 4:30, its fine- I will see her  ----- Message -----  From: Leila Harman MA  Sent: 1/15/2021  10:22 AM EST  To: Ludivina GABRIEL MD    Patient is working today until 4:30.  Do you have any other days or options?

## 2021-02-05 NOTE — TELEPHONE ENCOUNTER
I spoke with the patient..she just saw you on 01/29/21. Are you wanting her to come back that soon?

## 2021-02-08 NOTE — TELEPHONE ENCOUNTER
No. She was just sitting in my cancellation list box. If she has some appointment schedules, no need to make additional.

## 2021-07-06 ENCOUNTER — TELEPHONE (OUTPATIENT)
Dept: INTERNAL MEDICINE | Facility: CLINIC | Age: 36
End: 2021-07-06

## 2021-07-06 ENCOUNTER — HOSPITAL ENCOUNTER (EMERGENCY)
Facility: HOSPITAL | Age: 36
Discharge: HOME | End: 2021-07-06
Attending: EMERGENCY MEDICINE | Admitting: EMERGENCY MEDICINE

## 2021-07-06 ENCOUNTER — APPOINTMENT (OUTPATIENT)
Dept: CT IMAGING | Facility: HOSPITAL | Age: 36
End: 2021-07-06

## 2021-07-06 VITALS
TEMPERATURE: 98.6 F | SYSTOLIC BLOOD PRESSURE: 111 MMHG | HEIGHT: 63 IN | OXYGEN SATURATION: 100 % | WEIGHT: 175 LBS | HEART RATE: 68 BPM | DIASTOLIC BLOOD PRESSURE: 79 MMHG | BODY MASS INDEX: 31.01 KG/M2 | RESPIRATION RATE: 18 BRPM

## 2021-07-06 DIAGNOSIS — H81.13 VERTIGO, BENIGN PAROXYSMAL, BILATERAL: Primary | ICD-10-CM

## 2021-07-06 LAB
ALBUMIN SERPL-MCNC: 4.3 G/DL (ref 3.5–5.2)
ALBUMIN/GLOB SERPL: 1.1 G/DL
ALP SERPL-CCNC: 45 U/L (ref 39–117)
ALT SERPL W P-5'-P-CCNC: 13 U/L (ref 1–33)
ANION GAP SERPL CALCULATED.3IONS-SCNC: 8 MMOL/L (ref 5–15)
AST SERPL-CCNC: 16 U/L (ref 1–32)
BASOPHILS # BLD AUTO: 0.01 10*3/MM3 (ref 0–0.2)
BASOPHILS NFR BLD AUTO: 0.4 % (ref 0–1.5)
BILIRUB SERPL-MCNC: 0.7 MG/DL (ref 0–1.2)
BILIRUB UR QL STRIP: NEGATIVE
BUN SERPL-MCNC: 17 MG/DL (ref 6–20)
BUN/CREAT SERPL: 24.3 (ref 7–25)
CALCIUM SPEC-SCNC: 8.7 MG/DL (ref 8.6–10.5)
CHLORIDE SERPL-SCNC: 105 MMOL/L (ref 98–107)
CLARITY UR: CLEAR
CO2 SERPL-SCNC: 25 MMOL/L (ref 22–29)
COLOR UR: YELLOW
CREAT SERPL-MCNC: 0.7 MG/DL (ref 0.57–1)
DEPRECATED RDW RBC AUTO: 48.3 FL (ref 37–54)
EOSINOPHIL # BLD AUTO: 0.05 10*3/MM3 (ref 0–0.4)
EOSINOPHIL NFR BLD AUTO: 2.1 % (ref 0.3–6.2)
ERYTHROCYTE [DISTWIDTH] IN BLOOD BY AUTOMATED COUNT: 15.5 % (ref 12.3–15.4)
GFR SERPL CREATININE-BSD FRML MDRD: 116 ML/MIN/1.73
GLOBULIN UR ELPH-MCNC: 3.9 GM/DL
GLUCOSE SERPL-MCNC: 101 MG/DL (ref 65–99)
GLUCOSE UR STRIP-MCNC: NEGATIVE MG/DL
HCT VFR BLD AUTO: 33.2 % (ref 34–46.6)
HGB BLD-MCNC: 10.6 G/DL (ref 12–15.9)
HGB UR QL STRIP.AUTO: NEGATIVE
HOLD SPECIMEN: NORMAL
IMM GRANULOCYTES # BLD AUTO: 0.01 10*3/MM3 (ref 0–0.05)
IMM GRANULOCYTES NFR BLD AUTO: 0.4 % (ref 0–0.5)
KETONES UR QL STRIP: NEGATIVE
LEUKOCYTE ESTERASE UR QL STRIP.AUTO: NEGATIVE
LYMPHOCYTES # BLD AUTO: 0.95 10*3/MM3 (ref 0.7–3.1)
LYMPHOCYTES NFR BLD AUTO: 39.9 % (ref 19.6–45.3)
MCH RBC QN AUTO: 27.3 PG (ref 26.6–33)
MCHC RBC AUTO-ENTMCNC: 31.9 G/DL (ref 31.5–35.7)
MCV RBC AUTO: 85.6 FL (ref 79–97)
MONOCYTES # BLD AUTO: 0.23 10*3/MM3 (ref 0.1–0.9)
MONOCYTES NFR BLD AUTO: 9.7 % (ref 5–12)
NEUTROPHILS NFR BLD AUTO: 1.13 10*3/MM3 (ref 1.7–7)
NEUTROPHILS NFR BLD AUTO: 47.5 % (ref 42.7–76)
NITRITE UR QL STRIP: NEGATIVE
NRBC BLD AUTO-RTO: 0 /100 WBC (ref 0–0.2)
PH UR STRIP.AUTO: 6 [PH] (ref 5–8)
PLAT MORPH BLD: NORMAL
PLATELET # BLD AUTO: 143 10*3/MM3 (ref 140–450)
PMV BLD AUTO: 12 FL (ref 6–12)
POTASSIUM SERPL-SCNC: 4 MMOL/L (ref 3.5–5.2)
PROT SERPL-MCNC: 8.2 G/DL (ref 6–8.5)
PROT UR QL STRIP: NEGATIVE
RBC # BLD AUTO: 3.88 10*6/MM3 (ref 3.77–5.28)
RBC MORPH BLD: NORMAL
SODIUM SERPL-SCNC: 138 MMOL/L (ref 136–145)
SP GR UR STRIP: 1.02 (ref 1–1.03)
UROBILINOGEN UR QL STRIP: NORMAL
WBC # BLD AUTO: 2.38 10*3/MM3 (ref 3.4–10.8)
WBC MORPH BLD: NORMAL
WHOLE BLOOD HOLD SPECIMEN: NORMAL

## 2021-07-06 PROCEDURE — 81003 URINALYSIS AUTO W/O SCOPE: CPT | Performed by: EMERGENCY MEDICINE

## 2021-07-06 PROCEDURE — 80053 COMPREHEN METABOLIC PANEL: CPT | Performed by: EMERGENCY MEDICINE

## 2021-07-06 PROCEDURE — 99283 EMERGENCY DEPT VISIT LOW MDM: CPT

## 2021-07-06 PROCEDURE — 85025 COMPLETE CBC W/AUTO DIFF WBC: CPT | Performed by: EMERGENCY MEDICINE

## 2021-07-06 PROCEDURE — 85007 BL SMEAR W/DIFF WBC COUNT: CPT | Performed by: EMERGENCY MEDICINE

## 2021-07-06 PROCEDURE — 93005 ELECTROCARDIOGRAM TRACING: CPT | Performed by: EMERGENCY MEDICINE

## 2021-07-06 PROCEDURE — 70450 CT HEAD/BRAIN W/O DYE: CPT

## 2021-07-06 RX ORDER — MECLIZINE HYDROCHLORIDE 25 MG/1
25 TABLET ORAL EVERY 6 HOURS PRN
Qty: 28 TABLET | Refills: 0 | Status: SHIPPED | OUTPATIENT
Start: 2021-07-06 | End: 2021-07-13

## 2021-07-06 RX ORDER — MECLIZINE HYDROCHLORIDE 25 MG/1
25 TABLET ORAL ONCE
Status: COMPLETED | OUTPATIENT
Start: 2021-07-06 | End: 2021-07-06

## 2021-07-06 RX ORDER — SODIUM CHLORIDE 0.9 % (FLUSH) 0.9 %
10 SYRINGE (ML) INJECTION AS NEEDED
Status: DISCONTINUED | OUTPATIENT
Start: 2021-07-06 | End: 2021-07-06 | Stop reason: HOSPADM

## 2021-07-06 RX ADMIN — MECLIZINE HYDROCHLORIDE 25 MG: 25 TABLET ORAL at 11:54

## 2021-07-06 NOTE — ED PROVIDER NOTES
Subjective   36-year-old female presents with complaint of dizziness.  The patient reports that over the last 3 to 4 days she has had dizziness whenever she lies in a certain position or if she turns her head to the left.  She did not suffer any, trauma or injury to her head or neck.  She denies any acute infectious symptoms include no fever, bodies, or chills.  No upper respiratory congestion, sinus pain, sore throat, rhinorrhea, or change in sense of taste or smell.  No reported change in bowel function clued no blood in the stool or diarrhea.  No reported change in urinary function clued no dysuria, frequency, or urgency.  She has not tried any over-the-counter medication for the current symptoms.  No previous history of similar symptoms.  No other acute complaints.          Review of Systems   Constitutional: Negative for chills, fatigue and fever.   HENT: Negative for congestion, ear pain, postnasal drip, sinus pressure and sore throat.    Eyes: Negative for pain, redness and visual disturbance.   Respiratory: Negative for cough, chest tightness and shortness of breath.    Cardiovascular: Negative for chest pain, palpitations and leg swelling.   Gastrointestinal: Negative for abdominal pain, anal bleeding, blood in stool, diarrhea, nausea and vomiting.   Endocrine: Negative for polydipsia and polyuria.   Genitourinary: Negative for difficulty urinating, dysuria, frequency and urgency.   Musculoskeletal: Negative for arthralgias, back pain and neck pain.   Skin: Negative for pallor and rash.   Allergic/Immunologic: Negative for environmental allergies and immunocompromised state.   Neurological: Positive for dizziness. Negative for weakness and headaches.   Hematological: Negative for adenopathy.   Psychiatric/Behavioral: Negative for confusion, self-injury and suicidal ideas. The patient is not nervous/anxious.    All other systems reviewed and are negative.      Past Medical History:   Diagnosis Date   •  Anemia    • Disease of thyroid gland    • Elevated LFTs 9/27/2016   • Fatigue 9/27/2016   • Sinusitis 9/27/2016   • Sjogren's disease (CMS/HCC) 9/27/2016       No Known Allergies    Past Surgical History:   Procedure Laterality Date   • ADENOIDECTOMY     • TONSILLECTOMY     • TUBAL ABDOMINAL LIGATION  09/2019   • WISDOM TOOTH EXTRACTION         Family History   Adopted: Yes   Family history unknown: Yes       Social History     Socioeconomic History   • Marital status: Single     Spouse name: Not on file   • Number of children: Not on file   • Years of education: Not on file   • Highest education level: Not on file   Tobacco Use   • Smoking status: Never Smoker   • Smokeless tobacco: Never Used   Substance and Sexual Activity   • Alcohol use: Yes     Comment: social drinker   • Drug use: No   • Sexual activity: Defer     Partners: Male           Objective   Physical Exam  Vitals and nursing note reviewed.   Constitutional:       General: She is not in acute distress.     Appearance: Normal appearance. She is well-developed. She is not toxic-appearing or diaphoretic.   HENT:      Head: Normocephalic and atraumatic.      Right Ear: External ear normal.      Left Ear: External ear normal.      Nose: Nose normal.   Eyes:      General: Lids are normal.      Extraocular Movements:      Right eye: Nystagmus present.      Pupils: Pupils are equal, round, and reactive to light.      Comments: With right lateral gaze there is fast twitch nystagmus to the left bilaterally.   Neck:      Trachea: No tracheal deviation.   Cardiovascular:      Rate and Rhythm: Normal rate and regular rhythm.      Pulses: No decreased pulses.      Heart sounds: Normal heart sounds. No murmur heard.   No friction rub. No gallop.    Pulmonary:      Effort: Pulmonary effort is normal. No respiratory distress.      Breath sounds: Normal breath sounds. No decreased breath sounds, wheezing, rhonchi or rales.   Abdominal:      General: Bowel sounds are  normal.      Palpations: Abdomen is soft.      Tenderness: There is no abdominal tenderness. There is no guarding or rebound.   Musculoskeletal:         General: No deformity. Normal range of motion.      Cervical back: Normal range of motion and neck supple.   Lymphadenopathy:      Cervical: No cervical adenopathy.   Skin:     General: Skin is warm and dry.      Findings: No rash.   Neurological:      Mental Status: She is alert and oriented to person, place, and time.      GCS: GCS eye subscore is 4. GCS verbal subscore is 5. GCS motor subscore is 6.      Cranial Nerves: No cranial nerve deficit.      Sensory: No sensory deficit.      Comments: No focal neurological deficits on exam.     Normal strength in the face, and bilateral upper and lower extremities.   Psychiatric:         Speech: Speech normal.         Behavior: Behavior normal.         Thought Content: Thought content normal.         Judgment: Judgment normal.         Procedures           ED Course                                           MDM  Number of Diagnoses or Management Options  Vertigo, benign paroxysmal, bilateral: new and requires workup  Diagnosis management comments: CT scan of the head shows no acute abnormalities.  No lab abnormalities.  Vital signs have remained normal.    Patient will be discharged with a course of meclizine and advised to follow-up primary care physician for recheck in 1 week.    Advised to return to the ER with any further concern.       Amount and/or Complexity of Data Reviewed  Clinical lab tests: ordered and reviewed  Tests in the radiology section of CPT®: ordered and reviewed  Decide to obtain previous medical records or to obtain history from someone other than the patient: yes  Review and summarize past medical records: yes  Independent visualization of images, tracings, or specimens: yes        Final diagnoses:   Vertigo, benign paroxysmal, bilateral       ED Disposition  ED Disposition     ED Disposition  Condition Comment    Discharge Stable           Kami Diaz DO  [address] [address]  [phone]    In 1 week           Medication List      New Prescriptions    meclizine 25 MG tablet  Commonly known as: ANTIVERT  Take 1 tablet by mouth Every 6 (Six) Hours As Needed for Dizziness for up to 7 days.           Where to Get Your Medications      These medications were sent to [facility] [address] [place] [address] [facility] [facility] - [phone]  - [phone]   [address] [id number]    Phone: [phone]   · meclizine 25 MG tablet          [place] MD  07/06/21 0056

## 2021-07-06 NOTE — DISCHARGE INSTRUCTIONS
Make sure the drink plenty of fluids.    Take meclizine as needed help with dizziness.    Follow-up with primary care physician for recheck in 1 week.

## 2021-07-06 NOTE — TELEPHONE ENCOUNTER
Pt called office stated was having dizziness and headaches when turning head and has been doing this for last 3 days, even while lying down. Suggested pt go to ED due to possible testing.

## 2021-07-09 LAB
QT INTERVAL: 386 MS
QTC INTERVAL: 410 MS

## 2022-03-28 RX ORDER — LEVOTHYROXINE SODIUM 13 UG/1
CAPSULE ORAL
Qty: 60 CAPSULE | Refills: 11 | OUTPATIENT
Start: 2022-03-28

## 2022-05-27 NOTE — TELEPHONE ENCOUNTER
PLEASE CALL IN FOR PT TIROSINT [facility] IN [place]    PT ALSO WANTED TO KNOW IF WE HAVE SAMPLES OF THIS    PTS NUMBER  [phone]

## 2022-05-27 NOTE — TELEPHONE ENCOUNTER
Returned pt call. Advised that I would leave samples at .  Future appt 10/4/22    Pt has not been seen since 2020 she does have a future appt, I did not know if we could work her in sooner

## 2022-05-31 RX ORDER — LEVOTHYROXINE SODIUM 13 UG/1
CAPSULE ORAL
Qty: 60 CAPSULE | Refills: 4 | Status: SHIPPED | OUTPATIENT
Start: 2022-05-31 | End: 2022-08-22 | Stop reason: SDUPTHER

## 2022-05-31 RX ORDER — LEVOTHYROXINE SODIUM 13 UG/1
300 CAPSULE ORAL DAILY
Qty: 60 CAPSULE | Refills: 11 | Status: SHIPPED | OUTPATIENT
Start: 2022-05-31 | End: 2022-08-22 | Stop reason: SDUPTHER

## 2022-08-22 NOTE — TELEPHONE ENCOUNTER
Returned pt call. She stated that the cost has gone up at local pharmacy. I advised that we had samples, that were going to  soon, and she was welcome to those and we would send to Mail order pharmacy that specializes in Tirosint  Will pend the RX

## 2022-08-22 NOTE — TELEPHONE ENCOUNTER
PT CA;;ED STATING TIROSINT IS NOT COVERED. SHE REQUESTED WE LOOK INTO THIS AND REACH OUT TO HER. SHE REQUESTED SAMPLES UNTIL WE CAN GET THIS TAKEN CARE OF. PT IS OUT OF MEDS.

## 2022-08-23 ENCOUNTER — TELEPHONE (OUTPATIENT)
Dept: ENDOCRINOLOGY | Facility: CLINIC | Age: 37
End: 2022-08-23

## 2022-08-23 RX ORDER — LEVOTHYROXINE SODIUM 13 UG/1
300 CAPSULE ORAL DAILY
Qty: 180 CAPSULE | Refills: 1 | Status: SHIPPED | OUTPATIENT
Start: 2022-08-23 | End: 2023-02-14 | Stop reason: SDUPTHER

## 2023-02-14 ENCOUNTER — TELEPHONE (OUTPATIENT)
Dept: ENDOCRINOLOGY | Facility: CLINIC | Age: 38
End: 2023-02-14
Payer: COMMERCIAL

## 2023-02-14 RX ORDER — LEVOTHYROXINE SODIUM 13 UG/1
300 CAPSULE ORAL DAILY
Qty: 90 CAPSULE | Refills: 0 | Status: SHIPPED | OUTPATIENT
Start: 2023-02-14 | End: 2023-02-15

## 2023-02-14 NOTE — TELEPHONE ENCOUNTER
Returned pt call she stated she changed Ins[employer] companies and it is being denied. Stated that Pharmacy told her they sent the PA request to us, howwver there is no fax, PA etc in her changt from the [facility]

## 2023-02-14 NOTE — TELEPHONE ENCOUNTER
Patient called stated she is having issues getting her tirosint filled. She requested to speak with Dr. Thibodeaux's nurse. Please advise.

## 2023-02-15 ENCOUNTER — PRIOR AUTHORIZATION (OUTPATIENT)
Dept: ENDOCRINOLOGY | Facility: CLINIC | Age: 38
End: 2023-02-15
Payer: COMMERCIAL

## 2023-02-15 RX ORDER — LEVOTHYROXINE SODIUM 13 UG/1
300 CAPSULE ORAL DAILY
Qty: 60 CAPSULE | Refills: 0 | Status: SHIPPED | OUTPATIENT
Start: 2023-02-15

## 2023-03-01 ENCOUNTER — OFFICE VISIT (OUTPATIENT)
Dept: ENDOCRINOLOGY | Facility: CLINIC | Age: 38
End: 2023-03-01
Payer: COMMERCIAL

## 2023-03-01 VITALS
HEIGHT: 63 IN | HEART RATE: 86 BPM | SYSTOLIC BLOOD PRESSURE: 122 MMHG | DIASTOLIC BLOOD PRESSURE: 76 MMHG | OXYGEN SATURATION: 100 % | BODY MASS INDEX: 36.5 KG/M2 | WEIGHT: 206 LBS

## 2023-03-01 DIAGNOSIS — E03.9 ACQUIRED HYPOTHYROIDISM: Primary | ICD-10-CM

## 2023-03-01 DIAGNOSIS — R53.83 FATIGUE, UNSPECIFIED TYPE: ICD-10-CM

## 2023-03-01 PROCEDURE — 84439 ASSAY OF FREE THYROXINE: CPT | Performed by: INTERNAL MEDICINE

## 2023-03-01 PROCEDURE — 84480 ASSAY TRIIODOTHYRONINE (T3): CPT | Performed by: INTERNAL MEDICINE

## 2023-03-01 PROCEDURE — 99214 OFFICE O/P EST MOD 30 MIN: CPT | Performed by: INTERNAL MEDICINE

## 2023-03-01 PROCEDURE — 36415 COLL VENOUS BLD VENIPUNCTURE: CPT | Performed by: INTERNAL MEDICINE

## 2023-03-01 PROCEDURE — 84443 ASSAY THYROID STIM HORMONE: CPT | Performed by: INTERNAL MEDICINE

## 2023-03-01 RX ORDER — LEVOTHYROXINE AND LIOTHYRONINE 57; 13.5 UG/1; UG/1
180 TABLET ORAL DAILY
Qty: 60 TABLET | Refills: 6 | Status: SHIPPED | OUTPATIENT
Start: 2023-03-01

## 2023-03-01 NOTE — PROGRESS NOTES
"Chief complaint  Hypothyroidism    Subjective   Yuki Conner is a 37 y.o. female is here today for follow-up of hypothyroidism.     Hypothyroidism:  dx 2009. She has difficult to control disease, likely due to non compliance. TSH  was > 150 and we have increased the dose to 300 mcg, changed to Tirosint brand. Her TSH is improved to 20-30, she didn't have labs tested since 2021. She continues to have symptoms of fatigue, weight gain, swelling and joint pains.   She is taking Tirosint 300 mcg consistently. Insurance didn't cover it at full price, but she has received a samples.       Medications    Current Outpatient Medications:   •  Tirosint 150 MCG capsule, Take 2 capsules by mouth Daily. 2 po daily to equal 300 mcg, Disp: 60 capsule, Rfl: 0  •  Thyroid (NP THYROID) 90 MG PO tablet, Take 2 tablets by mouth Daily., Disp: 60 tablet, Rfl: 6    Review of systems  Review of Systems   Constitutional: Positive for fatigue and unexpected weight gain.   Cardiovascular: Positive for leg swelling.   Psychiatric/Behavioral: Positive for decreased concentration.   All other systems reviewed and are negative.      Physical exam  Objective   /76 (BP Location: Right arm, Patient Position: Sitting, Cuff Size: Adult)   Pulse 86   Ht 160 cm (63\")   Wt 93.4 kg (206 lb)   SpO2 100%   BMI 36.49 kg/m²  Physical Exam   Constitutional: She is oriented to person, place, and time. She appears well-developed and well-nourished.   HENT:   Head: Normocephalic and atraumatic.   Eyes: Conjunctivae are normal.   Neck: No thyromegaly present.   Cardiovascular: Normal rate, regular rhythm, normal heart sounds and normal pulses.   Pulmonary/Chest: Effort normal and breath sounds normal.   Musculoskeletal: Swelling (non pitting edema) present.   Neurological: She is alert and oriented to person, place, and time.   Psychiatric: Thought content normal.         LABS AND IMAGING  No visits with results within 1 Month(s) from this visit. "   Latest known visit with results is:   Admission on 07/06/2021, Discharged on 07/06/2021   Component Date Value Ref Range Status   • QT Interval 07/06/2021 386  ms Final   • QTC Interval 07/06/2021 410  ms Final   • Color, UA 07/06/2021 Yellow  Yellow, Straw Final   • Appearance, UA 07/06/2021 Clear  Clear Final   • pH, UA 07/06/2021 6.0  5.0 - 8.0 Final   • Specific Gravity, UA 07/06/2021 1.024  1.001 - 1.030 Final   • Glucose, UA 07/06/2021 Negative  Negative Final   • Ketones, UA 07/06/2021 Negative  Negative Final   • Bilirubin, UA 07/06/2021 Negative  Negative Final   • Blood, UA 07/06/2021 Negative  Negative Final   • Protein, UA 07/06/2021 Negative  Negative Final   • Leuk Esterase, UA 07/06/2021 Negative  Negative Final   • Nitrite, UA 07/06/2021 Negative  Negative Final   • Urobilinogen, UA 07/06/2021 0.2 E.U./dL  0.2 - 1.0 E.U./dL Final   • Extra Tube 07/06/2021 Hold for add-ons.   Final    Auto resulted.   • Extra Tube 07/06/2021 hold for add-on   Final    Auto resulted   • Extra Tube 07/06/2021 Hold for add-ons.   Final    Auto resulted.   • Extra Tube 07/06/2021 Hold for add-ons.   Final    Auto resulted.   • Glucose 07/06/2021 101 (H)  65 - 99 mg/dL Final   • BUN 07/06/2021 17  6 - 20 mg/dL Final   • Creatinine 07/06/2021 0.70  0.57 - 1.00 mg/dL Final   • Sodium 07/06/2021 138  136 - 145 mmol/L Final   • Potassium 07/06/2021 4.0  3.5 - 5.2 mmol/L Final   • Chloride 07/06/2021 105  98 - 107 mmol/L Final   • CO2 07/06/2021 25.0  22.0 - 29.0 mmol/L Final   • Calcium 07/06/2021 8.7  8.6 - 10.5 mg/dL Final   • Total Protein 07/06/2021 8.2  6.0 - 8.5 g/dL Final   • Albumin 07/06/2021 4.30  3.50 - 5.20 g/dL Final   • ALT (SGPT) 07/06/2021 13  1 - 33 U/L Final   • AST (SGOT) 07/06/2021 16  1 - 32 U/L Final   • Alkaline Phosphatase 07/06/2021 45  39 - 117 U/L Final   • Total Bilirubin 07/06/2021 0.7  0.0 - 1.2 mg/dL Final   • eGFR   Amer 07/06/2021 116  >60 mL/min/1.73 Final   • Globulin 07/06/2021 3.9   gm/dL Final   • A/G Ratio 07/06/2021 1.1  g/dL Final   • BUN/Creatinine Ratio 07/06/2021 24.3  7.0 - 25.0 Final   • Anion Gap 07/06/2021 8.0  5.0 - 15.0 mmol/L Final   • WBC 07/06/2021 2.38 (L)  3.40 - 10.80 10*3/mm3 Final   • RBC 07/06/2021 3.88  3.77 - 5.28 10*6/mm3 Final   • Hemoglobin 07/06/2021 10.6 (L)  12.0 - 15.9 g/dL Final   • Hematocrit 07/06/2021 33.2 (L)  34.0 - 46.6 % Final   • MCV 07/06/2021 85.6  79.0 - 97.0 fL Final   • MCH 07/06/2021 27.3  26.6 - 33.0 pg Final   • MCHC 07/06/2021 31.9  31.5 - 35.7 g/dL Final   • RDW 07/06/2021 15.5 (H)  12.3 - 15.4 % Final   • RDW-SD 07/06/2021 48.3  37.0 - 54.0 fl Final   • MPV 07/06/2021 12.0  6.0 - 12.0 fL Final   • Platelets 07/06/2021 143  140 - 450 10*3/mm3 Final   • Neutrophil % 07/06/2021 47.5  42.7 - 76.0 % Final   • Lymphocyte % 07/06/2021 39.9  19.6 - 45.3 % Final   • Monocyte % 07/06/2021 9.7  5.0 - 12.0 % Final   • Eosinophil % 07/06/2021 2.1  0.3 - 6.2 % Final   • Basophil % 07/06/2021 0.4  0.0 - 1.5 % Final   • Immature Grans % 07/06/2021 0.4  0.0 - 0.5 % Final   • Neutrophils, Absolute 07/06/2021 1.13 (L)  1.70 - 7.00 10*3/mm3 Final   • Lymphocytes, Absolute 07/06/2021 0.95  0.70 - 3.10 10*3/mm3 Final   • Monocytes, Absolute 07/06/2021 0.23  0.10 - 0.90 10*3/mm3 Final   • Eosinophils, Absolute 07/06/2021 0.05  0.00 - 0.40 10*3/mm3 Final   • Basophils, Absolute 07/06/2021 0.01  0.00 - 0.20 10*3/mm3 Final   • Immature Grans, Absolute 07/06/2021 0.01  0.00 - 0.05 10*3/mm3 Final   • nRBC 07/06/2021 0.0  0.0 - 0.2 /100 WBC Final   • RBC Morphology 07/06/2021 Normal  Normal Final   • WBC Morphology 07/06/2021 Normal  Normal Final   • Platelet Morphology 07/06/2021 Normal  Normal Final           Assessment  Assessment & Plan   1. Acquired hypothyroidism    2. Fatigue, unspecified type        Plan  Patient had difficult to control hypothyroidism likely due to the absorption issues and compliance.  She is taking Tirosint brand 300 mcg daily at this time.    Recheck the levels and adjust the dose accordingly.   I would like to transition her to NP thyroid, equivalent dose is 180 mg daily.   I have given her for now samples of the 90 mg NP thyroid to use with her 150 mcg Tirosint, and 100 mcg Tirosint with 120 mg of NP thyroid for slow transition. SHe would call if any symptoms of overactive thyroid present.     Follow-up in 6 weeks with repeat labs.    25 min spent for review of different options.

## 2023-03-02 LAB
T3 SERPL-MCNC: 112 NG/DL (ref 80–200)
T4 FREE SERPL-MCNC: 2.13 NG/DL (ref 0.93–1.7)
TSH SERPL DL<=0.05 MIU/L-ACNC: 2.51 UIU/ML (ref 0.27–4.2)

## 2023-03-05 DIAGNOSIS — E03.9 ACQUIRED HYPOTHYROIDISM: Primary | ICD-10-CM

## 2023-04-17 ENCOUNTER — OFFICE VISIT (OUTPATIENT)
Dept: ENDOCRINOLOGY | Facility: CLINIC | Age: 38
End: 2023-04-17
Payer: COMMERCIAL

## 2023-04-17 VITALS
DIASTOLIC BLOOD PRESSURE: 62 MMHG | SYSTOLIC BLOOD PRESSURE: 98 MMHG | WEIGHT: 200 LBS | BODY MASS INDEX: 35.44 KG/M2 | HEIGHT: 63 IN

## 2023-04-17 DIAGNOSIS — E03.9 ACQUIRED HYPOTHYROIDISM: Primary | ICD-10-CM

## 2023-04-17 PROCEDURE — 99213 OFFICE O/P EST LOW 20 MIN: CPT | Performed by: INTERNAL MEDICINE

## 2023-04-17 PROCEDURE — 36415 COLL VENOUS BLD VENIPUNCTURE: CPT | Performed by: INTERNAL MEDICINE

## 2023-04-17 NOTE — PROGRESS NOTES
"Chief complaint  Hypothyroidism (Patient is here for routine follow up. She reports no complaints )    Subjective   Yuki Conner is a 37 y.o. female is here today for follow-up of hypothyroidism.     Hypothyroidism:  dx 2009. She has difficult to control disease. TSH  was > 150 and we have increased the dose to 300 mcg, changed to Tirosint brand.Insurance didn't cover it at full price, but she has received a samples.   Last visit we have changed her to NP thyroid along with Tirosint and she does not feel any different. She has been consistent with the medication. Lost a few lbs.     Medications    Current Outpatient Medications:   •  Thyroid (NP THYROID) 90 MG PO tablet, Take 2 tablets by mouth Daily. (Patient taking differently: Take 1 tablet by mouth Daily.), Disp: 60 tablet, Rfl: 6  •  Tirosint 150 MCG capsule, Take 2 capsules by mouth Daily. 2 po daily to equal 300 mcg (Patient taking differently: Take 1 capsule by mouth Daily. 2 po daily to equal 300 mcg), Disp: 60 capsule, Rfl: 0    Review of systems  Review of Systems   Constitutional: Positive for fatigue.   Cardiovascular: Positive for leg swelling.   Psychiatric/Behavioral: Positive for decreased concentration.   All other systems reviewed and are negative.      Physical exam  Objective   BP 98/62 (BP Location: Right arm, Patient Position: Sitting, Cuff Size: Adult)   Ht 160 cm (62.99\")   Wt 90.7 kg (200 lb)   BMI 35.44 kg/m²  Physical Exam   Constitutional: She is oriented to person, place, and time. She appears well-developed and well-nourished.   HENT:   Head: Normocephalic and atraumatic.   Eyes: Conjunctivae are normal.   Neck: No thyromegaly present.   Cardiovascular: Normal rate, regular rhythm, normal heart sounds and normal pulses.   Pulmonary/Chest: Effort normal and breath sounds normal.   Musculoskeletal: Swelling (non pitting edema) present.   Lymphadenopathy:     She has no cervical adenopathy.   Neurological: She is alert and oriented " to person, place, and time.   Psychiatric: Thought content normal.         LABS AND IMAGING  No visits with results within 1 Month(s) from this visit.   Latest known visit with results is:   Office Visit on 03/01/2023   Component Date Value Ref Range Status   • TSH 03/01/2023 2.510  0.270 - 4.200 uIU/mL Final   • Free T4 03/01/2023 2.13 (H)  0.93 - 1.70 ng/dL Final   • T3, Total 03/01/2023 112.0  80.0 - 200.0 ng/dl Final           Assessment  Assessment & Plan   1. Acquired hypothyroidism        Plan  Patient had difficult to control hypothyroidism likely due to the absorption issues and compliance.   NP thyroid 90 mg and 150 mcg Tirosint.  Repeat labs and  Change to NP thyroid alone. Will determine dose after review of labs.   F/u in 6 m

## 2023-04-18 LAB
T3 SERPL-MCNC: 338 NG/DL (ref 71–180)
T4 FREE SERPL-MCNC: 1.92 NG/DL (ref 0.93–1.7)
TSH SERPL DL<=0.005 MIU/L-ACNC: 0.03 UIU/ML (ref 0.27–4.2)

## 2023-04-22 RX ORDER — LEVOTHYROXINE AND LIOTHYRONINE 76; 18 UG/1; UG/1
120 TABLET ORAL DAILY
Qty: 30 TABLET | Refills: 6 | Status: SHIPPED | OUTPATIENT
Start: 2023-04-22

## 2023-04-25 ENCOUNTER — PATIENT MESSAGE (OUTPATIENT)
Dept: ENDOCRINOLOGY | Facility: CLINIC | Age: 38
End: 2023-04-25
Payer: COMMERCIAL

## 2023-08-31 ENCOUNTER — TELEPHONE (OUTPATIENT)
Dept: ENDOCRINOLOGY | Facility: CLINIC | Age: 38
End: 2023-08-31
Payer: COMMERCIAL

## 2023-08-31 NOTE — TELEPHONE ENCOUNTER
Spoke with patient and she cannot get her NP thyroid or Tirosint rx because the cost. For NP thyroid it is 60 dollars for 30 day supply and Tirosint for 80 dollars. She requested another medication to be sent in that is more affordable. I advised we will not know what her co-pay is until it gets ran through the pharmacy. Told her I would ask the pharmacist here in office to check and see if she can get it cheaper through [facility] pharmacy then call her back. She voiced understanding.

## 2023-08-31 NOTE — TELEPHONE ENCOUNTER
PATIENT IS REQUESTING TO SPEAK WITH CLINIC STAFF REGARDING HER TIROSINT RX. PATIENT STATES THAT THE CO-PAY FOR THIS RX IS $80 FOR 30 DAY SUPPLY AND BELIEVES THAT DR CABALLERO WAS TO CHANGE THIS TO A MORE AFFORDABLE OPTION.    CALL BACK [phone]

## 2023-08-31 NOTE — TELEPHONE ENCOUNTER
Spoke with Og, our pharmacist in office and he was able to run generic synthroid and brand name. For the generic it is 15$ for 90 day. That is the cheapest option. Will verify with Dr. Thibodeaux if that medication is appropriate for the patient.

## 2023-09-03 RX ORDER — LEVOTHYROXINE SODIUM 0.15 MG/1
300 TABLET ORAL DAILY
Qty: 60 TABLET | Refills: 6 | Status: SHIPPED | COMMUNITY
Start: 2023-09-03

## 2023-09-04 NOTE — TELEPHONE ENCOUNTER
In the past we were not able to control her thyroid disease with levothyroxine. Her TSH was > 150 on levothyroxine and normalized after change to the brand name. We can give generic a try but I would like her to recheck the labs in 2 months. I will send rx for levothyroxine and order labs.

## 2023-09-12 ENCOUNTER — TELEPHONE (OUTPATIENT)
Dept: ENDOCRINOLOGY | Facility: CLINIC | Age: 38
End: 2023-09-12
Payer: COMMERCIAL

## 2023-09-12 RX ORDER — LEVOTHYROXINE SODIUM 0.15 MG/1
300 TABLET ORAL DAILY
Qty: 60 TABLET | Refills: 3 | Status: SHIPPED | OUTPATIENT
Start: 2023-09-12

## 2023-09-12 NOTE — TELEPHONE ENCOUNTER
Patient called stating she asked for a refill last week on her levothyroxine 150mcg medication and she went to pick it up and the pharmacy told her they didn't receive the prescription, she is asking for a call back.

## 2023-09-12 NOTE — TELEPHONE ENCOUNTER
Rx Refill Note  Requested Prescriptions      No prescriptions requested or ordered in this encounter      Last office visit with prescribing clinician: 4/17/2023     Next office visit with prescribing clinician: 10/31/2023

## 2024-03-18 NOTE — TELEPHONE ENCOUNTER
Rx Refill Note  Requested Prescriptions     Pending Prescriptions Disp Refills    levothyroxine (SYNTHROID, LEVOTHROID) 150 MCG tablet [Pharmacy Med Name: LEVOTHYROXINE 150 MCG TABLET] 60 tablet 3     Sig: TAKE 2 TABLETS BY MOUTH DAILY      Last office visit with prescribing clinician: 4/17/2023   Last telemedicine visit with prescribing clinician: Visit date not found   Next office visit with prescribing clinician: Visit date not found                         Would you like a call back once the refill request has been completed: [] Yes [] No    If the office needs to give you a call back, can they leave a voicemail: [] Yes [] No    Juliana Novoa MA  03/18/24, 08:03 EDT

## 2024-03-19 RX ORDER — LEVOTHYROXINE SODIUM 0.15 MG/1
300 TABLET ORAL DAILY
Qty: 60 TABLET | Refills: 3 | Status: SHIPPED | OUTPATIENT
Start: 2024-03-19

## 2024-04-05 ENCOUNTER — APPOINTMENT (OUTPATIENT)
Dept: GENERAL RADIOLOGY | Facility: HOSPITAL | Age: 39
End: 2024-04-05
Payer: COMMERCIAL

## 2024-04-05 ENCOUNTER — HOSPITAL ENCOUNTER (EMERGENCY)
Facility: HOSPITAL | Age: 39
Discharge: HOME | End: 2024-04-05
Attending: EMERGENCY MEDICINE
Payer: COMMERCIAL

## 2024-04-05 VITALS
RESPIRATION RATE: 16 BRPM | OXYGEN SATURATION: 99 % | HEART RATE: 97 BPM | BODY MASS INDEX: 33.66 KG/M2 | WEIGHT: 190 LBS | TEMPERATURE: 98 F | HEIGHT: 63 IN | SYSTOLIC BLOOD PRESSURE: 121 MMHG | DIASTOLIC BLOOD PRESSURE: 77 MMHG

## 2024-04-05 DIAGNOSIS — L02.511 ABSCESS OF RIGHT LITTLE FINGER: Primary | ICD-10-CM

## 2024-04-05 PROCEDURE — 99283 EMERGENCY DEPT VISIT LOW MDM: CPT

## 2024-04-05 PROCEDURE — 73140 X-RAY EXAM OF FINGER(S): CPT

## 2024-04-05 RX ORDER — IBUPROFEN 600 MG/1
600 TABLET ORAL EVERY 6 HOURS PRN
Qty: 12 TABLET | Refills: 0 | Status: SHIPPED | OUTPATIENT
Start: 2024-04-05 | End: 2024-04-08

## 2024-04-05 RX ORDER — LIDOCAINE HYDROCHLORIDE 20 MG/ML
10 INJECTION, SOLUTION INFILTRATION; PERINEURAL ONCE
Status: COMPLETED | OUTPATIENT
Start: 2024-04-05 | End: 2024-04-05

## 2024-04-05 RX ORDER — IBUPROFEN 800 MG/1
800 TABLET ORAL ONCE
Status: COMPLETED | OUTPATIENT
Start: 2024-04-05 | End: 2024-04-05

## 2024-04-05 RX ADMIN — IBUPROFEN 800 MG: 800 TABLET, FILM COATED ORAL at 20:29

## 2024-04-05 RX ADMIN — LIDOCAINE HYDROCHLORIDE 10 ML: 20 INJECTION, SOLUTION INFILTRATION; PERINEURAL at 20:09

## 2024-04-05 NOTE — ED PROVIDER NOTES
EMERGENCY DEPARTMENT ENCOUNTER    Pt Name: Yuki Conner  MRN: [id number]  Pt :   1985  Room Number:  26SF/26  Date of encounter:  2024  PCP: Kami Diaz DO  ED Provider: THALIA Hodges    Historian: Patient    HPI:  Chief Complaint: Right finger pain    Context: Yuki Conner is a 38 y.o. female who presents to the ED c/o right finger pain.  Patient reports that she had an incident where she jammed her right pinky finger last weekend.  She states that on Wednesday morning she woke up and had noticed that her right pinky finger was swollen and very painful.  She states that she went to the urgent care on Wednesday night where they stuck a needle in her finger and drained it and she was initiated on Bactrim.  She has been compliant with her antibiotics but has noticed continued pain and swelling in her right pinky.  HPI     REVIEW OF SYSTEMS  A chief complaint appropriate review of systems was completed and is negative except as noted in the HPI.     PAST MEDICAL HISTORY  Past Medical History:   Diagnosis Date    Anemia     Disease of thyroid gland     Elevated LFTs 2016    Fatigue 2016    Hypothyroidism     Sinusitis 2016    Sjogren's disease 2016       PAST SURGICAL HISTORY  Past Surgical History:   Procedure Laterality Date    ADENOIDECTOMY      TONSILLECTOMY      TUBAL ABDOMINAL LIGATION  2019    WISDOM TOOTH EXTRACTION         FAMILY HISTORY  Family History   Adopted: Yes   Family history unknown: Yes       SOCIAL HISTORY  Social History     Socioeconomic History    Marital status: Single   Tobacco Use    Smoking status: Never    Smokeless tobacco: Never   Vaping Use    Vaping status: Never Used   Substance and Sexual Activity    Alcohol use: Yes     Comment: social drinker    Drug use: No    Sexual activity: Defer     Partners: Male       ALLERGIES  Patient has no known allergies.    PHYSICAL EXAM  Physical Exam  Vitals and nursing note reviewed.    Constitutional:       General: She is not in acute distress.     Appearance: Normal appearance. She is not ill-appearing.   HENT:      Head: Normocephalic and atraumatic.      Nose: Nose normal.      Mouth/Throat:      Mouth: Mucous membranes are moist.   Eyes:      Extraocular Movements: Extraocular movements intact.   Cardiovascular:      Rate and Rhythm: Normal rate.      Pulses: Normal pulses.      Comments: Neurovascularly intact.  Brisk capillary refill.  Pulmonary:      Effort: Pulmonary effort is normal.   Musculoskeletal:        Hands:       Cervical back: Normal range of motion and neck supple.      Comments: Area of tenderness, erythema and swelling distal aspect of right little finger.  Area of fluid collection consistent with abscess.   Skin:     General: Skin is warm and dry.      Capillary Refill: Capillary refill takes less than 2 seconds.   Neurological:      General: No focal deficit present.      Mental Status: She is alert.      Sensory: No sensory deficit.   Psychiatric:         Mood and Affect: Mood normal.         Behavior: Behavior normal.       LAB RESULTS      If labs were ordered, I independently reviewed the results and considered them in treating the patient.    RADIOLOGY  XR Finger 2+ View Right   Final Result   Impression:   No acute bony abnormality or foreign body is seen.         Electronically Signed: Horacio Kothari MD     4/5/2024 7:37 PM EDT     Workstation ID: [id number]        [x] Radiologist's Report Reviewed:  I ordered and independently interpreted the above noted radiographic studies.  See radiologist's dictation for official interpretation.      PROCEDURES    Incision & Drainage    Date/Time: 4/5/2024 6:50 PM    Performed by: Sai Swan PA  Authorized by: Pablo Heaton MD    Consent:     Consent obtained:  Verbal    Consent given by:  Patient    Risks discussed:  Bleeding, incomplete drainage, infection and pain  Universal protocol:     Patient identity confirmed:   Verbally with patient  Location:     Type:  Abscess    Location:  Upper extremity    Upper extremity location:  Finger    Finger location:  R small finger  Pre-procedure details:     Skin preparation:  Chlorhexidine  Anesthesia:     Anesthesia method:  Local infiltration    Local anesthetic:  Lidocaine 2% w/o epi  Procedure type:     Complexity:  Simple  Procedure details:     Incision types:  Stab incision    Incision depth:  Subcutaneous    Wound management:  Probed and deloculated    Drainage:  Purulent    Drainage amount:  Moderate    Wound treatment:  Wound left open  Post-procedure details:     Procedure completion:  Tolerated      No orders to display       MEDICATIONS GIVEN IN ER    Medications   lidocaine (XYLOCAINE) 2% injection 10 mL (10 mL Injection Given by Other 4/5/24 2009)   ibuprofen (ADVIL,MOTRIN) tablet 800 mg (800 mg Oral Given 4/5/24 2029)       MEDICAL DECISION MAKING, PROGRESS, and CONSULTS   Medical Decision Making  Problems Addressed:  Abscess of right little finger: complicated acute illness or injury    Amount and/or Complexity of Data Reviewed  Radiology: ordered.    Risk  Prescription drug management.        All labs have been independently reviewed by me.  All radiology studies have been interpreted by me and the radiologist dictating the report.  All EKG's have been independently interpreted by me as well as and overseeing attending physician.    [] Discussed with radiology regarding test interpretation:    Discussion below represents my analysis of pertinent findings related to patient's condition, differential diagnosis, treatment plan and final disposition.    Differential diagnosis:  The differential diagnosis associated with the patient's presentation includes: Contusion, arthritis, fracture, dislocation, tendon/ligamentous injury, cellulitis, abscess, felon, paronychia    Additional sources  Discussed/ obtained information from independent historians:   [] Spouse  [] Parent  []  Family member  [x] Friend  [] EMS   [] Other:  External (non-ED) record review:   [] Inpatient record:   [] Office record:   [] Outpatient record:   [] Prior Outpatient labs:   [] Prior Outpatient radiology:   [] Primary Care record:   [] Outside ED record:   [x] Other: Personally reviewed primary care visit from April 3, 2024 where patient was seen evaluated with diagnosis of paronychia of finger, right  Patient's care impacted by:   [] Diabetes  [] Hypertension  [] Hyperlipidemia  [x] Hypothyroidism   [] Coronary Artery Disease   [] COPD   [] Cancer   [] Obesity  [] GERD   [] Tobacco Abuse   [] Substance Abuse    [] Anxiety   [] Depression   [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  The patient/family indicated understanding of these instructions.  I spent adequate time at the bedside preceding discharge necessary to personally discuss the aftercare instructions, giving patient education, providing explanations of the results of our evaluations/findings, and my decision making to assure that the patient/family understand the plan of care.  Time was allotted to answer questions at that time and throughout the ED course.  Emphasis was placed on timely follow-up after discharge.  I also discussed the potential for the development of an acute emergent condition requiring further evaluation, admission, or even surgical intervention. I  discussed that we found nothing during the visit today indicating the need for further workup, admission, or the presence of an unstable medical condition.  I encouraged the patient to return to the emergency department immediately for ANY concerns, worsening, new complaints, or if symptoms persist and unable to seek follow-up in a timely fashion.  The patient/family expressed understanding and agreement with this plan.  The patient will follow-up with primary care and orthopedic hand as needed for reevaluation.      Orders placed during this visit:  Orders Placed This Encounter   Procedures    Incision & Drainage    XR Finger 2+ View Right       I considered prescription management  with:   [] Pain medication  [] Antiviral  [x] Antibiotic: Patient instructed to continue antibiotics as prescribed   [] Other:   Rationale:    ED Course:    ED Course as of 04/06/24 0256 Fri Apr 05, 2024 1925 Vitals and Telemetry tracing was reviewed and directly interpreted by myself demonstrating blood pressure 121/77, afebrile, heart rate of 97, respirations of 16 and oxygen saturation 99% on room air [JG]   1925 BP: 121/77 [JG]   1925 Temp: 98 °F (36.7 °C) [JG]   1925 Heart Rate: 97 [JG]   1925 Resp: 16 [JG]   1925 SpO2: 99 % [JG]   1943 X-ray right finger personally interpreted by myself and with official read provided by radiology demonstrates no acute bony abnormality or foreign body is seen. [JG]   Sat Apr 06, 2024 0252 38-year-old female with history and exam consistent with abscess to the finger. Initial consideration in this patient included abscess, cellulitis, cyst and other infections. Patient presented with complaint of swelling/redness and pain, and was noted to have fluctuant fluid collection palpable on exam consistent with abscess.  Abscess felt to be amenable to incision and drainage (I&D) based on size on duration of symptoms.  After discussion of risks, benefits, and alternatives to I&D, the patient provided  verbal consent to the procedure as noted above.  The abscess cavity was evacuated and probed thoroughly without evidence of significant depth or extension.  The abscess cavity was not packed due to small size of the cavity after drainage.  The patient tolerated the procedure well without significant complications as noted above. Antibiotic treatment was felt to be indicated at this time and patient instructed to continue antibiotics as prescribed. Prior to discharge, we discussed return precautions, treatment, and follow up with primary care doctor within one week for further evaluation or orthopedic hand if symptoms persist or worsen. Patient demonstrated understanding and agreement with this plan.  [JG]      ED Course User Index  [JG] Sai Swan PA            DIAGNOSIS  Final diagnoses:   Abscess of right little finger       DISPOSITION    ED Disposition       ED Disposition   Discharge    Condition   Stable    Comment   --               Please note that portions of this document were completed with voice recognition software.        Sai Swan PA  04/06/24 0256

## 2024-04-06 NOTE — DISCHARGE INSTRUCTIONS
Symptomatic care is recommended. Take all medications as prescribed and instructed.  Take and complete full course of antibiotics as previously prescribed.  Follow up with your primary care and orthopedic hand as directed or return to Emergency Department with worsening of symptoms.

## 2024-04-07 VITALS
HEART RATE: 73 BPM | TEMPERATURE: 98 F | WEIGHT: 190 LBS | HEIGHT: 63 IN | OXYGEN SATURATION: 97 % | SYSTOLIC BLOOD PRESSURE: 120 MMHG | RESPIRATION RATE: 16 BRPM | DIASTOLIC BLOOD PRESSURE: 83 MMHG | BODY MASS INDEX: 33.66 KG/M2

## 2024-04-07 PROCEDURE — 99283 EMERGENCY DEPT VISIT LOW MDM: CPT

## 2024-04-08 ENCOUNTER — HOSPITAL ENCOUNTER (EMERGENCY)
Facility: HOSPITAL | Age: 39
Discharge: HOME | End: 2024-04-08
Attending: EMERGENCY MEDICINE | Admitting: EMERGENCY MEDICINE
Payer: COMMERCIAL

## 2024-04-08 DIAGNOSIS — L02.511 ABSCESS OF RIGHT LITTLE FINGER: Primary | ICD-10-CM

## 2024-04-08 RX ORDER — OXYCODONE HYDROCHLORIDE AND ACETAMINOPHEN 5; 325 MG/1; MG/1
1 TABLET ORAL ONCE
Status: COMPLETED | OUTPATIENT
Start: 2024-04-08 | End: 2024-04-08

## 2024-04-08 RX ADMIN — OXYCODONE HYDROCHLORIDE AND ACETAMINOPHEN 1 TABLET: 5; 325 TABLET ORAL at 01:24

## 2024-04-08 NOTE — Clinical Note
[facility] EMERGENCY DEPARTMENT  [address]  [facility] [address]  Phone: [phone]    Yuki Conner was seen and treated in our emergency department on 4/7/2024.  She may return to work on 04/10/2024.         Thank you for choosing [facility].    Gray Camacho MD

## 2024-04-08 NOTE — Clinical Note
[facility] EMERGENCY DEPARTMENT  [address]  [facility] [address]  Phone: [phone]    Yuki Conner was seen and treated in our emergency department on 4/7/2024.  She may return to work on 04/10/2024.         Thank you for choosing [facility].    Gray Camacho MD

## 2024-04-08 NOTE — ED PROVIDER NOTES
EMERGENCY DEPARTMENT ENCOUNTER    Pt Name: Yuki Conner  MRN: [id number]  Pt :   1985  Room Number:  VR01/V1  Date of encounter:  2024  PCP: Kami Diaz DO  ED Provider: THALIA Hodges    Historian: Patient    HPI:  Chief Complaint: Right pinky pain and swelling    Context: Yuki Conner is a 38 y.o. female who presents to the ED c/o right pinky pain and swelling.  Patient was seen and evaluated on April 3, 2024 at urgent care with diagnosis of paronychia of her finger.  At this time the patient underwent a needle aspiration and was placed on antibiotics.  She later presented to the emergency department on 2024 with significant pain, swelling and fluid collection in her right pinky finger.  I&D was performed and patient instructed to continue antibiotics as prescribed.  Patient also instructed to follow-up with orthopedic hand should her symptoms persist, worsen or not show improvement.  Patient presents back to the ER tonight with complaints of pain and swelling in her right pinky finger.  She denies fever.  No additional complaints on interview and exam.  HPI     REVIEW OF SYSTEMS  A chief complaint appropriate review of systems was completed and is negative except as noted in the HPI.     PAST MEDICAL HISTORY  Past Medical History:   Diagnosis Date    Anemia     Disease of thyroid gland     Elevated LFTs 2016    Fatigue 2016    Hypothyroidism     Sinusitis 2016    Sjogren's disease 2016       PAST SURGICAL HISTORY  Past Surgical History:   Procedure Laterality Date    ADENOIDECTOMY      TONSILLECTOMY      TUBAL ABDOMINAL LIGATION  2019    WISDOM TOOTH EXTRACTION         FAMILY HISTORY  Family History   Adopted: Yes   Family history unknown: Yes       SOCIAL HISTORY  Social History     Socioeconomic History    Marital status: Single   Tobacco Use    Smoking status: Never    Smokeless tobacco: Never   Vaping Use    Vaping status: Never Used    Substance and Sexual Activity    Alcohol use: Yes     Comment: social drinker    Drug use: No    Sexual activity: Defer     Partners: Male       ALLERGIES  Patient has no known allergies.    PHYSICAL EXAM  Physical Exam  Vitals and nursing note reviewed.   Constitutional:       General: She is not in acute distress.     Appearance: Normal appearance. She is not ill-appearing or toxic-appearing.   HENT:      Head: Normocephalic and atraumatic.      Nose: Nose normal.      Mouth/Throat:      Mouth: Mucous membranes are moist.   Eyes:      Extraocular Movements: Extraocular movements intact.   Cardiovascular:      Rate and Rhythm: Normal rate.   Pulmonary:      Effort: Pulmonary effort is normal.   Musculoskeletal:      Right hand: Swelling and tenderness present. Decreased range of motion.      Cervical back: Normal range of motion and neck supple.   Skin:     General: Skin is warm and dry.      Capillary Refill: Capillary refill takes less than 2 seconds.   Neurological:      General: No focal deficit present.      Mental Status: She is alert.   Psychiatric:         Mood and Affect: Mood normal.         Behavior: Behavior normal.       LAB RESULTS      If labs were ordered, I independently reviewed the results and considered them in treating the patient.    RADIOLOGY  No orders to display     [] Radiologist's Report Reviewed:  I ordered and independently interpreted the above noted radiographic studies.  See radiologist's dictation for official interpretation.      PROCEDURES    Procedures    No orders to display       MEDICATIONS GIVEN IN ER    Medications   oxyCODONE-acetaminophen (PERCOCET) 5-325 MG per tablet 1 tablet (1 tablet Oral Given 4/8/24 0124)       MEDICAL DECISION MAKING, PROGRESS, and CONSULTS   Medical Decision Making  Problems Addressed:  Abscess of right little finger: complicated acute illness or injury    Risk  Prescription drug management.        All labs have been independently reviewed by me.   All radiology studies have been interpreted by me and the radiologist dictating the report.  All EKG's have been independently interpreted by me as well as and overseeing attending physician.    [] Discussed with radiology regarding test interpretation:    Discussion below represents my analysis of pertinent findings related to patient's condition, differential diagnosis, treatment plan and final disposition.    Differential diagnosis:  The differential diagnosis associated with the patient's presentation includes: Cellulitis, abscess, felon, paronychia  Additional sources  Discussed/ obtained information from independent historians:   [] Spouse  [] Parent  [] Family member  [] Friend  [] EMS   [] Other:  External (non-ED) record review:   [] Inpatient record:   [] Office record:   [] Outpatient record:   [] Prior Outpatient labs:   [] Prior Outpatient radiology:   [] Primary Care record:   [] Outside ED record:   [] Other: Personally reviewed urgent care visit on April 3, 2024 patient was seen evaluated with diagnosis of paronychia of finger.  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  The  patient/family indicated understanding of these instructions.  I spent adequate time at the bedside preceding discharge necessary to personally discuss the aftercare instructions, giving patient education, providing explanations of the results of our evaluations/findings, and my decision making to assure that the patient/family understand the plan of care.  Time was allotted to answer questions at that time and throughout the ED course.  Emphasis was placed on timely follow-up after discharge.  I also discussed the potential for the development of an acute emergent condition requiring further evaluation, admission, or even surgical intervention. I discussed that we found nothing during the visit today indicating the need for further workup, admission, or the presence of an unstable medical condition.  I encouraged the patient to return to the emergency department immediately for ANY concerns, worsening, new complaints, or if symptoms persist and unable to seek follow-up in a timely fashion.  The patient/family expressed understanding and agreement with this plan.  The patient will follow-up with orthopedic can for reevaluation.      Orders placed during this visit:  No orders of the defined types were placed in this encounter.    I considered prescription management  with:   [] Pain medication  [] Antiviral  [x] Antibiotic: Already prescribed antibiotics for treatment of fingertip infection   [] Other:   Rationale:    ED Course:    ED Course as of 04/08/24 0344   Sun Apr 07, 2024   2325 Vitals and Telemetry tracing was reviewed and directly interpreted by myself demonstrating blood pressure 120/83, afebrile, heart rate 73, respirations 16 breaths/min and oxygen saturation 97% on room air [JG]   Mon Apr 08, 2024   0342 BP: 120/83 [JG]   0342 Temp: 98 °F (36.7 °C) [JG]   0342 Heart Rate: 73 [JG]   0342 Resp: 16 [JG]   0342 SpO2: 97 % [JG]   0342 In summary this is a nontoxic-appearing 38-year-old female presents ER  for evaluation again of right pinky pain and swelling.  History of previous abscess and I&D at right pinky finger.  Patient prescribed and is currently completing course of antibiotics.  Provided 1 dose of pain medication while in ED.  Neurovascularly intact.  Discharged with recommendation to follow-up with orthopedic hand.  Patient agreeable to plan of care, given return precautions and showed understanding. [JG]      ED Course User Index  [JG] Sai Swan PA            DIAGNOSIS  Final diagnoses:   Abscess of right little finger       DISPOSITION    ED Disposition       ED Disposition   Discharge    Condition   Stable    Comment   --               Please note that portions of this document were completed with voice recognition software.        Sai Swan PA  04/08/24 3015

## 2024-04-08 NOTE — DISCHARGE INSTRUCTIONS
Symptomatic care is recommended. Take all medications as prescribed and instructed. Follow up with Orthopedic Hand care as directed or return to Emergency Department with worsening of symptoms.

## 2024-04-25 ENCOUNTER — TELEPHONE (OUTPATIENT)
Dept: INTERNAL MEDICINE | Facility: CLINIC | Age: 39
End: 2024-04-25

## 2024-04-25 NOTE — TELEPHONE ENCOUNTER
Caller: Yuki Conner    Relationship: Self    Best call back number: [phone]     What was the call regarding: PATIENT NEEDS A LETTER FOR WORK STATING THAT SHE NEEDS ACCES TO WATER TO KEEP HYDRATED AND WASH HER HANDS BECAUSE OF RECENT ABSCESS.     Is it okay if the provider responds through MyChart:

## 2024-04-30 ENCOUNTER — LAB (OUTPATIENT)
Dept: LAB | Facility: HOSPITAL | Age: 39
End: 2024-04-30
Payer: COMMERCIAL

## 2024-04-30 ENCOUNTER — OFFICE VISIT (OUTPATIENT)
Dept: INTERNAL MEDICINE | Facility: CLINIC | Age: 39
End: 2024-04-30
Payer: COMMERCIAL

## 2024-04-30 VITALS
RESPIRATION RATE: 16 BRPM | HEIGHT: 63 IN | SYSTOLIC BLOOD PRESSURE: 116 MMHG | BODY MASS INDEX: 37.63 KG/M2 | WEIGHT: 212.4 LBS | OXYGEN SATURATION: 100 % | DIASTOLIC BLOOD PRESSURE: 76 MMHG | HEART RATE: 60 BPM

## 2024-04-30 DIAGNOSIS — L02.511 ABSCESS OF RIGHT LITTLE FINGER: ICD-10-CM

## 2024-04-30 DIAGNOSIS — E66.01 CLASS 2 SEVERE OBESITY DUE TO EXCESS CALORIES WITH SERIOUS COMORBIDITY AND BODY MASS INDEX (BMI) OF 37.0 TO 37.9 IN ADULT: ICD-10-CM

## 2024-04-30 DIAGNOSIS — L02.511 ABSCESS OF RIGHT LITTLE FINGER: Primary | ICD-10-CM

## 2024-04-30 LAB
ALBUMIN SERPL-MCNC: 4.2 G/DL (ref 3.5–5.2)
ALBUMIN/GLOB SERPL: 1.2 G/DL
ALP SERPL-CCNC: 58 U/L (ref 39–117)
ALT SERPL W P-5'-P-CCNC: 22 U/L (ref 1–33)
ANION GAP SERPL CALCULATED.3IONS-SCNC: 11.2 MMOL/L (ref 5–15)
AST SERPL-CCNC: 17 U/L (ref 1–32)
BILIRUB SERPL-MCNC: 0.6 MG/DL (ref 0–1.2)
BUN SERPL-MCNC: 10 MG/DL (ref 6–20)
BUN/CREAT SERPL: 11.8 (ref 7–25)
CALCIUM SPEC-SCNC: 8.6 MG/DL (ref 8.6–10.5)
CHLORIDE SERPL-SCNC: 106 MMOL/L (ref 98–107)
CO2 SERPL-SCNC: 22.8 MMOL/L (ref 22–29)
CREAT SERPL-MCNC: 0.85 MG/DL (ref 0.57–1)
EGFRCR SERPLBLD CKD-EPI 2021: 91.2 ML/MIN/1.73
GLOBULIN UR ELPH-MCNC: 3.6 GM/DL
GLUCOSE SERPL-MCNC: 92 MG/DL (ref 65–99)
POTASSIUM SERPL-SCNC: 3.6 MMOL/L (ref 3.5–5.2)
PROT SERPL-MCNC: 7.8 G/DL (ref 6–8.5)
SODIUM SERPL-SCNC: 140 MMOL/L (ref 136–145)

## 2024-04-30 PROCEDURE — 80053 COMPREHEN METABOLIC PANEL: CPT

## 2024-04-30 PROCEDURE — 99213 OFFICE O/P EST LOW 20 MIN: CPT | Performed by: INTERNAL MEDICINE

## 2024-04-30 PROCEDURE — 84480 ASSAY TRIIODOTHYRONINE (T3): CPT | Performed by: INTERNAL MEDICINE

## 2024-04-30 RX ORDER — IBUPROFEN 600 MG/1
TABLET ORAL
COMMUNITY
Start: 2024-04-06

## 2024-04-30 NOTE — PROGRESS NOTES
"Subjective   Yuki Conner is a 38 y.o. female.   Chief Complaint   Patient presents with    Hand Pain       History of Present Illness   Originally went to  for her finger injury and given Bactrim. Went to ED later and I and D was done. She was referred to ortho and was evaluated. She completed Bactrim and finger is better.   The following portions of the patient's history were reviewed and updated as appropriate: allergies, current medications, past family history, past medical history, past social history, past surgical history, and problem list.    Review of Systems   Constitutional:  Negative for fatigue and fever.   HENT:  Negative for sinus pressure and sinus pain.    Respiratory:  Negative for cough and shortness of breath.    Cardiovascular:  Negative for chest pain.   Neurological:  Negative for weakness.   Psychiatric/Behavioral:  Negative for confusion.      /76   Pulse 60   Resp 16   Ht 160 cm (62.99\")   Wt 96.3 kg (212 lb 6.4 oz)   LMP 04/03/2024 (Approximate)   SpO2 100%   BMI 37.63 kg/m²     Objective   Physical Exam  Vitals reviewed.   Cardiovascular:      Rate and Rhythm: Normal rate and regular rhythm.   Pulmonary:      Effort: No respiratory distress.      Breath sounds: No wheezing.   Neurological:      Mental Status: She is alert and oriented to person, place, and time.   Psychiatric:         Behavior: Behavior normal.         Assessment & Plan   Diagnoses and all orders for this visit:    1. Abscess of right little finger (Primary)  -     Comprehensive Metabolic Panel; Future  Completed Bactrim that was prescribed by . She has not had her kidney function checked in about 3 years. Will check it today               "

## 2024-11-01 PROBLEM — M79.646 PAIN OF FINGER: Status: ACTIVE | Noted: 2024-04-11

## 2024-11-13 ENCOUNTER — PATIENT ROUNDING (BHMG ONLY) (OUTPATIENT)
Dept: URGENT CARE | Facility: CLINIC | Age: 39
End: 2024-11-13
Payer: COMMERCIAL

## 2024-12-02 RX ORDER — LEVOTHYROXINE SODIUM 150 UG/1
300 TABLET ORAL DAILY
Qty: 60 TABLET | Refills: 3 | OUTPATIENT
Start: 2024-12-02

## 2024-12-02 NOTE — TELEPHONE ENCOUNTER
Rx Refill Note  Requested Prescriptions     Pending Prescriptions Disp Refills    levothyroxine (SYNTHROID, LEVOTHROID) 150 MCG tablet [Pharmacy Med Name: LEVOTHYROXINE 150 MCG TABLET] 60 tablet 3     Sig: TAKE 2 TABLETS BY MOUTH DAILY      Last office visit with prescribing clinician: 4/17/2023     Next office visit with prescribing clinician: Visit date not found

## 2025-01-03 RX ORDER — LEVOTHYROXINE SODIUM 150 UG/1
300 TABLET ORAL DAILY
Qty: 60 TABLET | Refills: 2 | Status: SHIPPED | OUTPATIENT
Start: 2025-01-03

## 2025-01-03 NOTE — TELEPHONE ENCOUNTER
Rx Refill Note  Requested Prescriptions     Pending Prescriptions Disp Refills    levothyroxine (SYNTHROID, LEVOTHROID) 150 MCG tablet [Pharmacy Med Name: LEVOTHYROXINE 150 MCG TABLET] 60 tablet 2     Sig: TAKE 2 TABLETS BY MOUTH DAILY      Last office visit with prescribing clinician: 4/17/2023     Next office visit with prescribing clinician:     Patient will need to schedule appointment to continue receiving refills.       Germania Gonsalez  01/03/25, 16:06 EST

## 2025-01-08 ENCOUNTER — TELEPHONE (OUTPATIENT)
Dept: INTERNAL MEDICINE | Facility: CLINIC | Age: 40
End: 2025-01-08
Payer: COMMERCIAL

## 2025-01-08 NOTE — TELEPHONE ENCOUNTER
Name: ConnerYuki hill      Relationship: Self      Best Callback Number: [phone]       HUB PROVIDED THE RELAY MESSAGE FROM THE OFFICE      PATIENT: VOICED UNDERSTANDING AND HAS NO FURTHER QUESTIONS AT THIS TIME    ADDITIONAL INFORMATION:

## 2025-01-08 NOTE — TELEPHONE ENCOUNTER
**HUB TO RELAY**  LM for patient that appointment would be moved to 9:00 from 8:15.  due to Diaz out in AM.  Can call back to reschedule if that time is not convenient.

## 2025-01-09 ENCOUNTER — PATIENT MESSAGE (OUTPATIENT)
Dept: INTERNAL MEDICINE | Facility: CLINIC | Age: 40
End: 2025-01-09

## 2025-01-09 ENCOUNTER — OFFICE VISIT (OUTPATIENT)
Dept: INTERNAL MEDICINE | Facility: CLINIC | Age: 40
End: 2025-01-09
Payer: COMMERCIAL

## 2025-01-09 VITALS
TEMPERATURE: 96.6 F | BODY MASS INDEX: 38.06 KG/M2 | HEIGHT: 63 IN | WEIGHT: 214.8 LBS | DIASTOLIC BLOOD PRESSURE: 82 MMHG | SYSTOLIC BLOOD PRESSURE: 130 MMHG

## 2025-01-09 DIAGNOSIS — Z00.00 HEALTHCARE MAINTENANCE: Primary | ICD-10-CM

## 2025-01-09 DIAGNOSIS — R58 BLOOD ON TOILET PAPER: ICD-10-CM

## 2025-01-09 DIAGNOSIS — E66.01 CLASS 2 SEVERE OBESITY DUE TO EXCESS CALORIES WITH SERIOUS COMORBIDITY AND BODY MASS INDEX (BMI) OF 38.0 TO 38.9 IN ADULT: ICD-10-CM

## 2025-01-09 DIAGNOSIS — E66.812 CLASS 2 SEVERE OBESITY DUE TO EXCESS CALORIES WITH SERIOUS COMORBIDITY AND BODY MASS INDEX (BMI) OF 38.0 TO 38.9 IN ADULT: ICD-10-CM

## 2025-01-09 DIAGNOSIS — Z23 NEED FOR INFLUENZA VACCINATION: ICD-10-CM

## 2025-01-09 PROBLEM — E66.09 OBESITY DUE TO EXCESS CALORIES WITH SERIOUS COMORBIDITY: Status: ACTIVE | Noted: 2025-01-09

## 2025-01-09 LAB
BILIRUB BLD-MCNC: NEGATIVE MG/DL
CLARITY, POC: CLEAR
COLOR UR: YELLOW
EXPIRATION DATE: NORMAL
GLUCOSE UR STRIP-MCNC: NEGATIVE MG/DL
KETONES UR QL: NEGATIVE
LEUKOCYTE EST, POC: NEGATIVE
Lab: NORMAL
NITRITE UR-MCNC: NEGATIVE MG/ML
PH UR: 6 [PH] (ref 5–8)
PROT UR STRIP-MCNC: NEGATIVE MG/DL
RBC # UR STRIP: NEGATIVE /UL
SP GR UR: 1.03 (ref 1–1.03)
UROBILINOGEN UR QL: NORMAL

## 2025-01-09 PROCEDURE — 90471 IMMUNIZATION ADMIN: CPT | Performed by: INTERNAL MEDICINE

## 2025-01-09 PROCEDURE — 81003 URINALYSIS AUTO W/O SCOPE: CPT | Performed by: INTERNAL MEDICINE

## 2025-01-09 PROCEDURE — 90656 IIV3 VACC NO PRSV 0.5 ML IM: CPT | Performed by: INTERNAL MEDICINE

## 2025-01-09 PROCEDURE — 99395 PREV VISIT EST AGE 18-39: CPT | Performed by: INTERNAL MEDICINE

## 2025-01-09 NOTE — PROGRESS NOTES
Chief Complaint   Patient presents with    Annual Exam    Weight Check     Trying to loose weight       Reported Health  Good Yes  FairNo  PoorNo      Dental,Vision,Hearing  Regular dental visitsYes  Vision ProblemsNo  Hearing LossNo      Immunization Status:  Up To DateNo        Lifestyle  Healthy DietYes  Weight ConcernsYes  Regular ExerciseYes  Tobacco UseNo  Alcohol UseYes  Drug AbuseNo      Screening  Cancer ScreeningYes  Metabolic ScreeningYes  Risk ScreeningYes  Past Medical History:   Diagnosis Date    Anemia     Disease of thyroid gland     Elevated LFTs 09/27/2016    Fatigue 09/27/2016    Hypothyroidism     Sinusitis 09/27/2016    Sjogren's disease 09/27/2016     Past Surgical History:   Procedure Laterality Date    ADENOIDECTOMY      TONSILLECTOMY      TUBAL ABDOMINAL LIGATION  09/2019    WISDOM TOOTH EXTRACTION       Family History   Adopted: Yes   Family history unknown: Yes     Social History     Socioeconomic History    Marital status: Single   Tobacco Use    Smoking status: Never    Smokeless tobacco: Never   Vaping Use    Vaping status: Never Used   Substance and Sexual Activity    Alcohol use: Yes     Comment: social drinker    Drug use: No    Sexual activity: Yes     Partners: Male         Review of Systems   Constitutional:  Negative for diaphoresis and fatigue.   HENT:  Negative for nosebleeds, postnasal drip, rhinorrhea and sinus pain.    Respiratory:  Negative for cough and shortness of breath.    Cardiovascular:  Negative for chest pain and leg swelling.   Gastrointestinal:  Negative for abdominal pain, diarrhea, nausea and vomiting.   Genitourinary:  Negative for dysuria and frequency.   Musculoskeletal:  Negative for myalgias.   Neurological:  Negative for weakness.   Psychiatric/Behavioral:  Negative for confusion.        Physical Exam  Vitals reviewed.   HENT:      Right Ear: Tympanic membrane and ear canal normal.      Left Ear: Tympanic membrane and ear canal normal.       Mouth/Throat:      Pharynx: No oropharyngeal exudate or posterior oropharyngeal erythema.   Eyes:      General: No scleral icterus.     Pupils: Pupils are equal, round, and reactive to light.   Cardiovascular:      Rate and Rhythm: Normal rate and regular rhythm.      Heart sounds: No murmur heard.  Pulmonary:      Effort: No respiratory distress.      Breath sounds: No wheezing.   Abdominal:      General: There is no distension.      Palpations: Abdomen is soft.      Tenderness: There is no abdominal tenderness.   Musculoskeletal:      Right lower leg: No edema.      Left lower leg: No edema.   Neurological:      General: No focal deficit present.      Mental Status: She is alert and oriented to person, place, and time.   Psychiatric:         Mood and Affect: Mood normal.         Behavior: Behavior normal.                   Diet and Exercise    Healthy Diet Yes  Adequate DietYes  Poor DietNo  Adequate Exercise RegimenYes  Inadequate Exercise RegimenNo      Cervical Cancer Screening    Risks and benefits discussedYes  Screening currentYes  PAP Done Today OrderedNo  Screening Not IndicatedNo  Pap Every 3 yearsYes  Screening Done By GYNYes    Breast Cancer screening  Not indicated      STD Testing  ChlamydiaNo  GonorrheaNo  HIVNo      Osteoporosis Screening  Not indicated    Colorectal Cancer Screening  Reports blood on TP when wipes, intermittent.  Does have hx hemorrhoids, will proceed with colonoscopy.     Metabolic Screening  GlucoseYes  LipidsYes  CBCYes  TSHNo  UAYes  CMPYes  25OHNo      Immunizations  Risks and benefits discussedYes  Immunizations Up To DateNo  Immunizations NeededYes  Immunizations Per OrdersYes  Patient DNoeclines      Preventative Counseling  NutritionYes  Aerobic ExerciseYes  Weight Bearing ExerciseYes  Weight LossYes  Calcium SupplementsNo  Vitamin D SupplementsYes  Reproductive HealthNo  Cardiovascular Risk ReductionYes  Tobacco CessationNo  Alcohol UseYes  Sunscreen UseYes  Self Skin  ExaminationYes  Helmet UseNo  Seat Belt UseYes  Fall Risk ReductionNo  Advanced Directive PlanningNo      Patient Discussion  PatientYes  FamilyNo  CounselingYes  Diagnoses and all orders for this visit:    1. Healthcare maintenance (Primary)  -     POCT urinalysis dipstick, automated  -     CBC & Differential; Future  -     Comprehensive Metabolic Panel; Future  -     Lipid Panel; Future    2. Need for influenza vaccination  -     Fluzone >6mos    3. Class 2 severe obesity due to excess calories with serious comorbidity and body mass index (BMI) of 38.0 to 38.9 in adult  She is going to check with her insurance and see if Wegovy is covered.   4. Blood on toilet paper  -     Ambulatory Referral For Screening Colonoscopy

## 2025-01-10 ENCOUNTER — PRIOR AUTHORIZATION (OUTPATIENT)
Dept: INTERNAL MEDICINE | Facility: CLINIC | Age: 40
End: 2025-01-10
Payer: COMMERCIAL

## 2025-01-10 ENCOUNTER — OFFICE VISIT (OUTPATIENT)
Dept: ENDOCRINOLOGY | Facility: CLINIC | Age: 40
End: 2025-01-10
Payer: COMMERCIAL

## 2025-01-10 VITALS
HEIGHT: 63 IN | WEIGHT: 214.3 LBS | SYSTOLIC BLOOD PRESSURE: 122 MMHG | DIASTOLIC BLOOD PRESSURE: 70 MMHG | HEART RATE: 66 BPM | BODY MASS INDEX: 37.97 KG/M2

## 2025-01-10 DIAGNOSIS — E03.9 ACQUIRED HYPOTHYROIDISM: Primary | ICD-10-CM

## 2025-01-10 DIAGNOSIS — Z00.00 HEALTHCARE MAINTENANCE: ICD-10-CM

## 2025-01-10 PROCEDURE — 80050 GENERAL HEALTH PANEL: CPT | Performed by: INTERNAL MEDICINE

## 2025-01-10 PROCEDURE — 84439 ASSAY OF FREE THYROXINE: CPT | Performed by: INTERNAL MEDICINE

## 2025-01-10 NOTE — PROGRESS NOTES
"Chief complaint  Hypothyroidism    Subjective   Yuki Conner is a 39 y.o. female is here today for follow-up of hypothyroidism.     Hypothyroidism:  dx 2009. She has difficult to control disease. TSH  was > 150 and we have increased the dose to 300 mcg.   Later on medication was changed to Tirosint brand with good response but insurance didn't cover it.    Change to NP thyroid resulted in high thyroid levels and we have reduced the dose of medication.   She lost follow-up and was not seen since 4/2023.     Later she returned back to levothyroxine 300 mcg daily.   She has been consistent with the medication, takes it on an empty stomach and apart from other medications or vitamins.     C/o fatigue, weight gain. She has seen Dr Diaz and started on Wegovy. It is pending approval.     Medications    Current Outpatient Medications:     levothyroxine (SYNTHROID, LEVOTHROID) 150 MCG tablet, TAKE 2 TABLETS BY MOUTH DAILY, Disp: 60 tablet, Rfl: 2    Review of systems  Review of Systems   Constitutional:  Positive for fatigue.   Cardiovascular:  Positive for leg swelling.   Psychiatric/Behavioral:  Positive for decreased concentration.    All other systems reviewed and are negative.      Physical exam  Objective   /70 (BP Location: Right arm, Patient Position: Sitting)   Pulse 66   Ht 160 cm (62.99\")   Wt 97.2 kg (214 lb 4.8 oz)   BMI 37.97 kg/m²  Physical Exam   Constitutional: She is oriented to person, place, and time. She appears well-developed and well-nourished.   HENT:   Head: Normocephalic and atraumatic.   Eyes: Conjunctivae are normal.   Neck: No thyromegaly present.   Cardiovascular: Normal rate, regular rhythm, normal heart sounds and normal pulses.   Pulmonary/Chest: Effort normal and breath sounds normal.   Musculoskeletal: Swelling (non pitting edema) present.   Lymphadenopathy:     She has no cervical adenopathy.   Neurological: She is alert and oriented to person, place, and time. "   Psychiatric: Thought content normal.         LABS AND IMAGING  Office Visit on 01/09/2025   Component Date Value Ref Range Status    Color 01/09/2025 Yellow  Yellow, Straw, Dark Yellow, [place] Final    Clarity, UA 01/09/2025 Clear  Clear Final    Specific Gravity  01/09/2025 1.030  1.005 - 1.030 Final    pH, Urine 01/09/2025 6.0  5.0 - 8.0 Final    Leukocytes 01/09/2025 Negative  Negative Final    Nitrite, UA 01/09/2025 Negative  Negative Final    Protein, POC 01/09/2025 Negative  Negative mg/dL Final    Glucose, UA 01/09/2025 Negative  Negative mg/dL Final    Ketones, UA 01/09/2025 Negative  Negative Final    Urobilinogen, UA 01/09/2025 Normal  Normal, 0.2 E.U./dL Final    Bilirubin 01/09/2025 Negative  Negative Final    Blood, UA 01/09/2025 Negative  Negative Final    Lot Number 01/09/2025 [id number]   Final    Expiration Date 01/09/2025 06/04/2026   Final           Assessment  Assessment & Plan   1. Acquired hypothyroidism    2. Healthcare maintenance          Plan  Patient had difficult to control hypothyroidism likely due to the absorption issues. In the past levothyroxine resulted in severe hypothyroidism.   Repeat thyroid levels today and consider change to NP thyroid. It was effective in the past and this medication would be beneficial in weight loss.     I have advised to contact her insurance regarding the Wegovy and ask if there is a weight loss program as part of insurance benefits. It is easier to get GLP-1 agonist approved if enrolled in the program .    F/u in 4-6 m

## 2025-01-11 LAB
ALBUMIN SERPL-MCNC: 3.9 G/DL (ref 3.5–5.2)
ALBUMIN/GLOB SERPL: 1 G/DL
ALP SERPL-CCNC: 68 U/L (ref 39–117)
ALT SERPL W P-5'-P-CCNC: 22 U/L (ref 1–33)
ANION GAP SERPL CALCULATED.3IONS-SCNC: 6.9 MMOL/L (ref 5–15)
AST SERPL-CCNC: 17 U/L (ref 1–32)
BASOPHILS # BLD AUTO: 0.01 10*3/MM3 (ref 0–0.2)
BASOPHILS NFR BLD AUTO: 0.3 % (ref 0–1.5)
BILIRUB SERPL-MCNC: 0.5 MG/DL (ref 0–1.2)
BUN SERPL-MCNC: 10 MG/DL (ref 6–20)
BUN/CREAT SERPL: 12.5 (ref 7–25)
CALCIUM SPEC-SCNC: 9.1 MG/DL (ref 8.6–10.5)
CHLORIDE SERPL-SCNC: 102 MMOL/L (ref 98–107)
CO2 SERPL-SCNC: 26.1 MMOL/L (ref 22–29)
CREAT SERPL-MCNC: 0.8 MG/DL (ref 0.57–1)
DEPRECATED RDW RBC AUTO: 39.4 FL (ref 37–54)
EGFRCR SERPLBLD CKD-EPI 2021: 97.5 ML/MIN/1.73
EOSINOPHIL # BLD AUTO: 0.03 10*3/MM3 (ref 0–0.4)
EOSINOPHIL NFR BLD AUTO: 1 % (ref 0.3–6.2)
ERYTHROCYTE [DISTWIDTH] IN BLOOD BY AUTOMATED COUNT: 13 % (ref 12.3–15.4)
GLOBULIN UR ELPH-MCNC: 4.1 GM/DL
GLUCOSE SERPL-MCNC: 85 MG/DL (ref 65–99)
HCT VFR BLD AUTO: 39.1 % (ref 34–46.6)
HGB BLD-MCNC: 12.2 G/DL (ref 12–15.9)
IMM GRANULOCYTES # BLD AUTO: 0 10*3/MM3 (ref 0–0.05)
IMM GRANULOCYTES NFR BLD AUTO: 0 % (ref 0–0.5)
LYMPHOCYTES # BLD AUTO: 1.4 10*3/MM3 (ref 0.7–3.1)
LYMPHOCYTES NFR BLD AUTO: 46.5 % (ref 19.6–45.3)
MCH RBC QN AUTO: 26.3 PG (ref 26.6–33)
MCHC RBC AUTO-ENTMCNC: 31.2 G/DL (ref 31.5–35.7)
MCV RBC AUTO: 84.4 FL (ref 79–97)
MONOCYTES # BLD AUTO: 0.32 10*3/MM3 (ref 0.1–0.9)
MONOCYTES NFR BLD AUTO: 10.6 % (ref 5–12)
NEUTROPHILS NFR BLD AUTO: 1.25 10*3/MM3 (ref 1.7–7)
NEUTROPHILS NFR BLD AUTO: 41.6 % (ref 42.7–76)
NRBC BLD AUTO-RTO: 0 /100 WBC (ref 0–0.2)
PLATELET # BLD AUTO: 213 10*3/MM3 (ref 140–450)
PMV BLD AUTO: 11.8 FL (ref 6–12)
POTASSIUM SERPL-SCNC: 4.1 MMOL/L (ref 3.5–5.2)
PROT SERPL-MCNC: 8 G/DL (ref 6–8.5)
RBC # BLD AUTO: 4.63 10*6/MM3 (ref 3.77–5.28)
SODIUM SERPL-SCNC: 135 MMOL/L (ref 136–145)
T4 FREE SERPL-MCNC: 1.76 NG/DL (ref 0.92–1.68)
TSH SERPL DL<=0.05 MIU/L-ACNC: 0.58 UIU/ML (ref 0.27–4.2)
WBC NRBC COR # BLD AUTO: 3.01 10*3/MM3 (ref 3.4–10.8)

## 2025-01-13 ENCOUNTER — TELEPHONE (OUTPATIENT)
Dept: INTERNAL MEDICINE | Facility: CLINIC | Age: 40
End: 2025-01-13
Payer: COMMERCIAL

## 2025-01-13 NOTE — TELEPHONE ENCOUNTER
----- Message from Kami Diaz sent at 1/13/2025  7:48 AM EST -----  Mild decrease in sodium but okay.  White count low but improving. She saw specialist for this a few years ago and believes it is autimmune related. Anemia has resolved.

## 2025-01-13 NOTE — TELEPHONE ENCOUNTER
"Patient returned call and asked after the PA   Shared note \"PA sent to plan for Wegovy, awaiting determination\" Patient understanding and appreciative    ----- Message from Kami Diaz sent at 1/13/2025  7:48 AM EST -----  Mild decrease in sodium but okay.  White count low but improving. She saw specialist for this a few years ago and believes it is autimmune related. Anemia has resolved.   "

## 2025-01-31 DIAGNOSIS — E03.9 ACQUIRED HYPOTHYROIDISM: Primary | ICD-10-CM

## 2025-01-31 RX ORDER — THYROID 120 MG/1
120 TABLET ORAL DAILY
Qty: 30 TABLET | Refills: 10 | Status: SHIPPED | OUTPATIENT
Start: 2025-01-31

## 2025-07-18 ENCOUNTER — OFFICE VISIT (OUTPATIENT)
Dept: ENDOCRINOLOGY | Facility: CLINIC | Age: 40
End: 2025-07-18
Payer: COMMERCIAL

## 2025-07-18 VITALS
DIASTOLIC BLOOD PRESSURE: 78 MMHG | SYSTOLIC BLOOD PRESSURE: 124 MMHG | HEIGHT: 63 IN | WEIGHT: 212.6 LBS | OXYGEN SATURATION: 99 % | BODY MASS INDEX: 37.67 KG/M2 | HEART RATE: 68 BPM

## 2025-07-18 DIAGNOSIS — E03.9 ACQUIRED HYPOTHYROIDISM: Primary | ICD-10-CM

## 2025-07-18 LAB
T3 SERPL-MCNC: 118 NG/DL (ref 80–200)
T4 FREE SERPL-MCNC: 0.45 NG/DL (ref 0.92–1.68)
TSH SERPL DL<=0.05 MIU/L-ACNC: 16.2 UIU/ML (ref 0.27–4.2)

## 2025-07-18 PROCEDURE — 84439 ASSAY OF FREE THYROXINE: CPT | Performed by: INTERNAL MEDICINE

## 2025-07-18 PROCEDURE — 84443 ASSAY THYROID STIM HORMONE: CPT | Performed by: INTERNAL MEDICINE

## 2025-07-18 PROCEDURE — 84480 ASSAY TRIIODOTHYRONINE (T3): CPT | Performed by: INTERNAL MEDICINE

## 2025-07-18 NOTE — PROGRESS NOTES
"Chief complaint  Acquired hypothyroidism    Subjective   Yuki Conner is a 40 y.o. female is here today for follow-up of hypothyroidism.     Hypothyroidism:  dx 2009. She has difficult to control disease. TSH  was > 150 and we have increased the dose to 300 mcg.   Later on medication was changed to Tirosint brand with good response but insurance didn't cover it.    Change to NP thyroid improved levels and sx initially.     C/o fatigue, weight gain despite exercising 4 times a week and following the diet.  Wegovy was not approved.   Medications    Current Outpatient Medications:     Thyroid (NP THYROID) 120 MG PO tablet, Take 1 tablet by mouth Daily., Disp: 30 tablet, Rfl: 10    Review of systems  Review of Systems   Constitutional:  Positive for fatigue.   HENT:  Positive for congestion.    Cardiovascular:  Positive for leg swelling.   Psychiatric/Behavioral:  Positive for decreased concentration.    All other systems reviewed and are negative.      Physical exam  Objective   /78 (BP Location: Right arm, Patient Position: Sitting, Cuff Size: Adult)   Pulse 68   Ht 160 cm (62.99\")   Wt 96.4 kg (212 lb 9.6 oz)   SpO2 99%   BMI 37.67 kg/m²  Physical Exam  Vitals reviewed.   Constitutional:       Appearance: Normal appearance.   Neck:      Thyroid: No thyromegaly.   Cardiovascular:      Rate and Rhythm: Normal rate and regular rhythm.      Pulses: Normal pulses.      Heart sounds: Normal heart sounds.   Pulmonary:      Effort: Pulmonary effort is normal.      Breath sounds: Normal breath sounds.   Musculoskeletal:         General: No swelling.   Neurological:      Mental Status: She is alert and oriented to person, place, and time.   Psychiatric:         Mood and Affect: Mood normal.         Thought Content: Thought content normal.                 LABS AND IMAGING  No visits with results within 1 Month(s) from this visit.   Latest known visit with results is:   Office Visit on 01/10/2025   Component Date " Value Ref Range Status    Glucose 01/10/2025 85  65 - 99 mg/dL Final    BUN 01/10/2025 10  6 - 20 mg/dL Final    Creatinine 01/10/2025 0.80  0.57 - 1.00 mg/dL Final    Sodium 01/10/2025 135 (L)  136 - 145 mmol/L Final    Potassium 01/10/2025 4.1  3.5 - 5.2 mmol/L Final    Chloride 01/10/2025 102  98 - 107 mmol/L Final    CO2 01/10/2025 26.1  22.0 - 29.0 mmol/L Final    Calcium 01/10/2025 9.1  8.6 - 10.5 mg/dL Final    Total Protein 01/10/2025 8.0  6.0 - 8.5 g/dL Final    Albumin 01/10/2025 3.9  3.5 - 5.2 g/dL Final    ALT (SGPT) 01/10/2025 22  1 - 33 U/L Final    AST (SGOT) 01/10/2025 17  1 - 32 U/L Final    Alkaline Phosphatase 01/10/2025 68  39 - 117 U/L Final    Total Bilirubin 01/10/2025 0.5  0.0 - 1.2 mg/dL Final    Globulin 01/10/2025 4.1  gm/dL Final    A/G Ratio 01/10/2025 1.0  g/dL Final    BUN/Creatinine Ratio 01/10/2025 12.5  7.0 - 25.0 Final    Anion Gap 01/10/2025 6.9  5.0 - 15.0 mmol/L Final    eGFR 01/10/2025 97.5  >60.0 mL/min/1.73 Final    Free T4 01/10/2025 1.76 (H)  0.92 - 1.68 ng/dL Final    TSH 01/10/2025 0.585  0.270 - 4.200 uIU/mL Final    WBC 01/10/2025 3.01 (L)  3.40 - 10.80 10*3/mm3 Final    RBC 01/10/2025 4.63  3.77 - 5.28 10*6/mm3 Final    Hemoglobin 01/10/2025 12.2  12.0 - 15.9 g/dL Final    Hematocrit 01/10/2025 39.1  34.0 - 46.6 % Final    MCV 01/10/2025 84.4  79.0 - 97.0 fL Final    MCH 01/10/2025 26.3 (L)  26.6 - 33.0 pg Final    MCHC 01/10/2025 31.2 (L)  31.5 - 35.7 g/dL Final    RDW 01/10/2025 13.0  12.3 - 15.4 % Final    RDW-SD 01/10/2025 39.4  37.0 - 54.0 fl Final    MPV 01/10/2025 11.8  6.0 - 12.0 fL Final    Platelets 01/10/2025 213  140 - 450 10*3/mm3 Final    Neutrophil % 01/10/2025 41.6 (L)  42.7 - 76.0 % Final    Lymphocyte % 01/10/2025 46.5 (H)  19.6 - 45.3 % Final    Monocyte % 01/10/2025 10.6  5.0 - 12.0 % Final    Eosinophil % 01/10/2025 1.0  0.3 - 6.2 % Final    Basophil % 01/10/2025 0.3  0.0 - 1.5 % Final    Immature Grans % 01/10/2025 0.0  0.0 - 0.5 % Final     Neutrophils, Absolute 01/10/2025 1.25 (L)  1.70 - 7.00 10*3/mm3 Final    Lymphocytes, Absolute 01/10/2025 1.40  0.70 - 3.10 10*3/mm3 Final    Monocytes, Absolute 01/10/2025 0.32  0.10 - 0.90 10*3/mm3 Final    Eosinophils, Absolute 01/10/2025 0.03  0.00 - 0.40 10*3/mm3 Final    Basophils, Absolute 01/10/2025 0.01  0.00 - 0.20 10*3/mm3 Final    Immature Grans, Absolute 01/10/2025 0.00  0.00 - 0.05 10*3/mm3 Final    nRBC 01/10/2025 0.0  0.0 - 0.2 /100 WBC Final           Assessment  Assessment & Plan   1. Acquired hypothyroidism          Plan  Patient had difficult to control hypothyroidism likely due to the absorption issues. In the past levothyroxine resulted in severe hypothyroidism. After return to NP thyroid levels improved.   -cont NP thyroid 120 mg daily   - repeat labs and adjust the dose.     F/u in 4-6 months

## 2025-07-23 ENCOUNTER — TELEPHONE (OUTPATIENT)
Dept: ENDOCRINOLOGY | Facility: CLINIC | Age: 40
End: 2025-07-23
Payer: COMMERCIAL

## 2025-07-23 NOTE — TELEPHONE ENCOUNTER
Pt was notified break the 120mg tablet in half?  That way you can take 1 and 1/2 of the 120mg to =180mg.     And that script for 180 mcg was sent to the phar.    She verbalized understanding.